# Patient Record
Sex: FEMALE
[De-identification: names, ages, dates, MRNs, and addresses within clinical notes are randomized per-mention and may not be internally consistent; named-entity substitution may affect disease eponyms.]

---

## 2020-05-04 ENCOUNTER — HOSPITAL ENCOUNTER (INPATIENT)
Dept: HOSPITAL 88 - FSED | Age: 55
LOS: 11 days | Discharge: NURSING, REHAB OR LONG-TERM CARE | DRG: 853 | End: 2020-05-15
Attending: INTERNAL MEDICINE | Admitting: INTERNAL MEDICINE
Payer: COMMERCIAL

## 2020-05-04 VITALS — WEIGHT: 100 LBS | BODY MASS INDEX: 19.63 KG/M2 | HEIGHT: 60 IN

## 2020-05-04 VITALS — DIASTOLIC BLOOD PRESSURE: 51 MMHG | SYSTOLIC BLOOD PRESSURE: 104 MMHG

## 2020-05-04 VITALS — SYSTOLIC BLOOD PRESSURE: 104 MMHG | DIASTOLIC BLOOD PRESSURE: 51 MMHG

## 2020-05-04 DIAGNOSIS — J69.0: ICD-10-CM

## 2020-05-04 DIAGNOSIS — E87.5: ICD-10-CM

## 2020-05-04 DIAGNOSIS — A41.9: Primary | ICD-10-CM

## 2020-05-04 DIAGNOSIS — L02.91: ICD-10-CM

## 2020-05-04 DIAGNOSIS — L89.210: ICD-10-CM

## 2020-05-04 PROCEDURE — 99251: CPT

## 2020-05-04 PROCEDURE — 84466 ASSAY OF TRANSFERRIN: CPT

## 2020-05-04 PROCEDURE — 94640 AIRWAY INHALATION TREATMENT: CPT

## 2020-05-04 PROCEDURE — 87075 CULTR BACTERIA EXCEPT BLOOD: CPT

## 2020-05-04 PROCEDURE — 84132 ASSAY OF SERUM POTASSIUM: CPT

## 2020-05-04 PROCEDURE — 94667 MNPJ CHEST WALL 1ST: CPT

## 2020-05-04 PROCEDURE — 84145 PROCALCITONIN (PCT): CPT

## 2020-05-04 PROCEDURE — 85025 COMPLETE CBC W/AUTO DIFF WBC: CPT

## 2020-05-04 PROCEDURE — 82805 BLOOD GASES W/O2 SATURATION: CPT

## 2020-05-04 PROCEDURE — 36569 INSJ PICC 5 YR+ W/O IMAGING: CPT

## 2020-05-04 PROCEDURE — 36600 WITHDRAWAL OF ARTERIAL BLOOD: CPT

## 2020-05-04 PROCEDURE — 71045 X-RAY EXAM CHEST 1 VIEW: CPT

## 2020-05-04 PROCEDURE — 80048 BASIC METABOLIC PNL TOTAL CA: CPT

## 2020-05-04 PROCEDURE — 87086 URINE CULTURE/COLONY COUNT: CPT

## 2020-05-04 PROCEDURE — 82607 VITAMIN B-12: CPT

## 2020-05-04 PROCEDURE — 94669 MECHANICAL CHEST WALL OSCILL: CPT

## 2020-05-04 PROCEDURE — 51700 IRRIGATION OF BLADDER: CPT

## 2020-05-04 PROCEDURE — 99284 EMERGENCY DEPT VISIT MOD MDM: CPT

## 2020-05-04 PROCEDURE — 81003 URINALYSIS AUTO W/O SCOPE: CPT

## 2020-05-04 PROCEDURE — 80202 ASSAY OF VANCOMYCIN: CPT

## 2020-05-04 PROCEDURE — 82746 ASSAY OF FOLIC ACID SERUM: CPT

## 2020-05-04 PROCEDURE — 87205 SMEAR GRAM STAIN: CPT

## 2020-05-04 PROCEDURE — 87071 CULTURE AEROBIC QUANT OTHER: CPT

## 2020-05-04 PROCEDURE — 87040 BLOOD CULTURE FOR BACTERIA: CPT

## 2020-05-04 PROCEDURE — 83540 ASSAY OF IRON: CPT

## 2020-05-04 PROCEDURE — 94668 MNPJ CHEST WALL SBSQ: CPT

## 2020-05-04 PROCEDURE — 80053 COMPREHEN METABOLIC PANEL: CPT

## 2020-05-04 PROCEDURE — 88304 TISSUE EXAM BY PATHOLOGIST: CPT

## 2020-05-04 PROCEDURE — 43246 EGD PLACE GASTROSTOMY TUBE: CPT

## 2020-05-04 PROCEDURE — 87070 CULTURE OTHR SPECIMN AEROBIC: CPT

## 2020-05-04 PROCEDURE — 87635 SARS-COV-2 COVID-19 AMP PRB: CPT

## 2020-05-04 PROCEDURE — 85045 AUTOMATED RETICULOCYTE COUNT: CPT

## 2020-05-04 PROCEDURE — 83605 ASSAY OF LACTIC ACID: CPT

## 2020-05-04 PROCEDURE — 76604 US EXAM CHEST: CPT

## 2020-05-04 PROCEDURE — 36415 COLL VENOUS BLD VENIPUNCTURE: CPT

## 2020-05-04 PROCEDURE — 87186 SC STD MICRODIL/AGAR DIL: CPT

## 2020-05-04 RX ADMIN — SODIUM CHLORIDE SCH MLS/HR: 9 INJECTION, SOLUTION INTRAVENOUS at 21:46

## 2020-05-04 NOTE — XMS REPORT
ICU 7 Greer/Kira @0220 (she will call when ready, tx another patient)   Patient Summary Document

                             Created on: 2020



MELANY TIRADO

External Reference #: [id number]

: 1965

Sex: Female



Demographics





                          Address                   [address] [place]  [address]

 

                          Home Phone                [phone]

 

                          Preferred Language        Unknown

 

                          Marital Status            Unknown

 

                          Oriental orthodox Affiliation     Unknown

 

                          Race                      Unknown

 

                                        Additional Race(s) 

Other



 

                          Ethnic Group              Unknown





Author





                          Author                    [facility]

t

 

                          Organization              [facility]

 

                          Address                   Unknown

 

                          Phone                     Unavailable







Support





                Name            Relationship    Address         Phone

 

                    KAT NEGRETE     PRS                 [address] [place]  [address]                     [phone]

 

                    NEGRETENEDA RICHARDS      Next Of Kin         [address], [place]  [address] [phone]

 

                NO,  FAMILY     Caregiver       Unknown         Unavailable

 

                NO,  PCP        Caregiver       Unknown         Unavailable

 

                    ZURI SERRA,  MIAN  Caregiver           [address] [place]  [address]                     [phone]

 

                    CHARLINE LADNON MD Caregiver           [address] [facility]

[place]  [address]                      Unavailable

 

                    NEGRETEACE RICHARDSRACHEL     PRS                 [address] [place]  [address]                     [phone]







Care Team Providers





                    Care Team Member Name Role                Phone

 

                    NO,  PCP            PP                  Unavailable

 

                    CHARLINE LANDON Unavailable         Unavailable







Payers





             Payer Name   Policy Type  Policy Number Effective Date Expiration D

ate

 

             [employer] [place] [employer]              [id number] 2017-10-01 00:00:0

0  

 

             [employer] [place] [employer]              [id number] 2017-10-01 00:00:0

0  







Problems

This patient has no known problems.



Allergies, Adverse Reactions, Alerts





        Allergy Name Allergy Type Status  Severity Reaction(s) Onset Date Inacti

ve Date 

Treating Clinician                      Comments

 

        No Known Allergies DA      Active  U               2019 00:00:00  

                







Medications





             Ordered Medication Name Filled Medication Name Start Date   Stop Da

te    Current 

Medication? Ordering Clinician Indication Dosage     Frequency  Signature (SIG) 

Comments                                Components

 

       Diazepam 5 Mg/5 Ml Solution Diazepam 5 Mg/5 Ml Solution               Yes

                  5             As 

Needed for Agitation                                 







Encounters





             Start Date/Time End Date/Time Encounter Type Admission Type Attendi

[facility]       Care Department     Encounter ID

 

             2019-03-10 21:01:00 2019-03-10 21:01:00 Registered Emergency Room 1

            DAR LANDON             [facility]              [id number]

 

        2019 13:49:00 2019 23:59:00 Discharged Recurring            

     [facility]  

[id number]







Results





           Test Description Test Time  Test Comments Text Results Atomic Results

 Result 

Comments

 

                GLUBED          2019 08:22:00                   

 

   

 

                GLUBED (test code = GLUBED) 96 mg/dL                  Perf

ormed by certified  at 

[facility]





[id number] 08:20:00* 



                Test Item       Value           Reference Range Comments

 

                GLUBED (test code = GLUBED) 108 mg/dL                 Perf

ormed by certified  at 

[facility]





[id number] 08:20:00* 



                Test Item       Value           Reference Range Comments

 

                GLUBED (test code = GLUBED) 117 mg/dL                 Perf

ormed by certified  at 

[facility]





[id number] 12:44:00* 



                Test Item       Value           Reference Range Comments

 

                GLUBED (test code = GLUBED) 104 mg/dL                 Perf

ormed by certified  at 

[facility]





[id number] 05:56:00* 



                Test Item       Value           Reference Range Comments

 

                GLUBED (test code = GLUBED) 114 mg/dL                 Perf

ormed by certified  at 

[facility]





BASIC METABOLIC [id number] 05:29:00* 



                Test Item       Value           Reference Range Comments

 

                SODIUM (test code = NA) 137 mmol/L      136-145          

 

                POTASSIUM (test code = K) 4.5 mmol/L      3.5-5.1          

 

                CHLORIDE (test code = CL) 104.0 mmol/L               

 

                CARBON DIOXIDE (test code = CO2) 23.0 mmol/L     21-32          

  

 

                ANION GAP (test code = GAP) 14.5            10-20            

 

                GLUCOSE (test code = GLU) 93 mg/dL                   

 

                BLOOD UREA NITROGEN (test code = BUN) 7 mg/dL         7-18      

       

 

                GLOMERULAR FILTRATION RATE (test code = GFR) > 60 mL/min     >=6

0            Estimated GFR by 

using Modified MDRD formula.Chronic kidney disease is defined as either kidney 
damageor GFR <60 mL/min/1.73 m2 for >3 months.

 

                CREATININE (test code = CREAT) 0.30 mg/dL      0.55-1.02       *

*Note change in reference 

range due to change in reagent.**

 

                BUN/CREATININE RATIO (test code = BUN/CREA) 21.6            10-2

0            

 

                CALCIUM (test code = CA) 8.6 mg/dL       8.5-10.1         





[id number] 05:29:00* 



                Test Item       Value           Reference Range Comments

 

                PHOSPHORUS (test code = PHOS) 3.7 mg/dL       2.5-4.9          





[id number] 05:29:00* 



                Test Item       Value           Reference Range Comments

 

                MAGNESIUM (test code = MAG) 2.5 mg/dL       1.8-2.4          





[id number] 00:11:00* 



                Test Item       Value           Reference Range Comments

 

                GLUBED (test code = GLUBED) 111 mg/dL                 Perf

ormed by certified  at 

[facility]





[id number] 20:33:00* 



                Test Item       Value           Reference Range Comments

 

                GLUBED (test code = GLUBED) 105 mg/dL                 Perf

ormed by certified  at 

[facility]





[id number] 16:47:00* 



                Test Item       Value           Reference Range Comments

 

                GLUBED (test code = GLUBED) 107 mg/dL                 Perf

ormed by certified  at 

[facility]





GASTRIC,[id number] 15:06:00
--------------------------------------------------------------------------------
------------RUN DATE: 19                         [facility]           
              PAGE 1   RUN TIME: 1506                            Specimen Inqui
ry                    RUN USER: INTERFACE                                       
                   ------------------------------------------------------------
--------------------------------PATIENT: MELANY NEGRETE                  ACCT #: [id number]
[id number] LOC:  DIANA DANG #: [id number]                                    
  AGE/SX: 54/F         ROOM:      RE19REG DR:  Damir Morley MD          :    [birth year]     BED:  A          DIS:                          
                     STATUS: ADM IN       TLOC:           --------------------
------------------------------------------------------------------------ SPEC #:
[id number]     RECD:      STATUS:  CL           [facility] #: [id number]
[id number]                           FRED: 19-          DR: Lisandro Reyes MD   
             ENTERED:      SP TYPE: GASTRIC BX     OTHR DR: Matthew Musa i, MD, Muhammad MDORDERED:  GROSS                                           
                                  COPIES TO:   Lisandro Reyes MD   [address]   [place], [place] [id number] [phone]    Matthew Gloria MD   [address]   [place], [place] [address] [phone]    Najma Thompson MD   [address] [place] [address]   [phone] PROCEDURES: GROSS () TISS
UES:           GASTRIC CORPUS - COLD BX         CLINICAL HISTORY    COLLECTION D
ATE:  2019       DYSPHAGIA   POST-OP DIAGNOSIS:  MILD GASTRITIS; PEG PLACEM
ENT             COMMENT   H. Pylori immunostaining is ordered to rule out Helico
bacter.  Addendum report to  follow.           FINAL DIAGNOSIS    Stomach, biops
y:        CHRONIC INACTIVE GASTRITIS, MODERATE        NO DIAGNOSTIC HELICOBACTER
IDENTIFIED        NO ULCERATION, INTESTINAL METAPLASIA, DYSPLASIA OR         MA
LIGNANCY IDENTIFIED                                    ** CONTINUED ON NEXT PAGE
** ----------------------------------------------------------------------------
----------------RUN DATE: 19                         [facility]       
                  PAGE 2   RUN TIME: 1506                            Specimen I
nquiry                    RUN USER: INTERFACE                                   
                       --------------------------------------------------------
------------------------------------SPEC #: [id number]    PATIENT: NEGRETE,TER
ASHOK                   #[id number]  (Continued)--------------------------------
------------------------------------------------------------         FINAL DIAGN
OSIS           (Continued)            FA/sm   D   [id number], [id number]           [facility]    The specimen is received in formalin, labeled with the patient's name,  
identified as "gastric biopsy", and consists of tan biopsy material measuring   
0.5 cm in aggregate.  An H E and a giemsa stain will be prepared.       GROSS P
ERFORMED AT [facility]   
[address] [place] [address]   [phone]           MICROSCOPI
C    Sections of the gastric biopsy shows a moderate increase in number of chron
ic   inflammatory infiltrates of the stroma and small lymphoid aggregates. There
is   no significant intraepithelial lymphocytosis.  Giemsa stain with appropria
te   control shows no diagnostic helicobacter.  No acute inflammation, intestina
l   metaplasia, dysplasia or malignancy is identified.--------------------------
------------------------------------------------------------------ Signed SIGNAT
URE ON FILE                        Fabián Bailey MD 19 1506     ----------
--------------------------------------------------------------------------------
--                                                      ** END OF REPORT ** 
GASTRIC,[id number] 15:06:00
--------------------------------------------------------------------------------
------------RUN DATE: 19                         [facility]           
              PAGE 1   RUN TIME: 1229                            Specimen Inqui
ry                    RUN USER: INTERFACE                                       
                   ------------------------------------------------------------
--------------------------------PATIENT: MELANY NEGRETE                  ACCT #: V
[id number] LOC:  DIANA      U #: [id number]                                    
  AGE/SX: 54/F         ROOM:      RE19MAXINE DR:  Damir Morley MD          :    [birth year]     BED:  A          DIS:                          
                     STATUS: ADM IN       TLOC:           --------------------
------------------------------------------------------------------------ SPEC #:
[id number]     RECD:      STATUS:  CL LUCAS #: [id number]
[id number]                           FRED: 19-         SUBM DR: Lisandro Reyes MD   
             ENTERED:      SP TYPE: GASTRIC BX     OTHR DR: Matthew Musa i, MD, Muhammad MDORDERED:  GROSS                                           
                                  COPIES TO:   Lisandro Reyes MD   [address] [place] [address] [phone]    Matthew Gloria MD   [address]   [place], [place] [id number] [phone]    Najma Thompson MD   [address] [place] [address]   [phone] PROCEDURES: GROSS () TISS
UES:           GASTRIC CORPUS - COLD BX        ADDENDUM FINDINGS  Addendum #1   
          Entered:    THE ADDENDUM IS ISSUED TO REPORT THE RESULT 
OF IMMUNOSTAIN.  H. Pylori immunostaining with appropriate control is performed 
at [facility] and interpreted  at [facility] and shows no evidenc
e of Helicobacter.  The diagnosis remains unchanges.     CPT code: 48887  Addend
um Signed SIGNATURE ON FILE               Fabián Bailey MD 19     ---
--------------------------------------------------------------------------------
---------                                   ** CONTINUED ON NEXT PAGE ** -------
--------------------------------------------------------------------------------
-----RUN DATE: 19                         [facility]                  
       PAGE 2   RUN TIME: 1229                            Specimen Inquiry      
             RUN USER: INTERFACE                                                
          -------------------------------------------------------------------
-------------------------SPEC #: [id number]    PATIENT: MELANY NEGRETE        
          #[id number]  (Continued)-------------------------------------------
-------------------------------------------------          CLINICAL HISTORY    C
OLLECTION DATE:  2019       DYSPHAGIA   POST-OP DIAGNOSIS:  MILD GASTRITIS;
PEG PLACEMENT             COMMENT   H. Pylori immunostaining is ordered to rule 
out Helicobacter.  Addendum report to  follow.           FINAL DIAGNOSIS    Sto
mach, biopsy:        CHRONIC INACTIVE GASTRITIS, MODERATE        NO DIAGNOSTIC H
ELICOBACTER IDENTIFIED        NO ULCERATION, INTESTINAL METAPLASIA, DYSPLASIA OR
        MALIGNANCY IDENTIFIED               FA/   D   [id number], 13655           
MACROSCOPIC    The specimen is received in formalin, labeled with the patient's 
name,   identified as "gastric biopsy", and consists of tan biopsy material eulogio
uring   0.5 cm in aggregate.  An H E and a giemsa stain will be prepared.       
GROSS PERFORMED AT [facility]   [address] [place] [address] [phone]           RAJ
ROSCOPIC    Sections of the gastric biopsy shows a moderate increase in number o
f chronic   inflammatory infiltrates of the stroma and small lymphoid aggregates
. There is   no significant intraepithelial lymphocytosis.  Giemsa stain with ap
propriate   control shows no diagnostic helicobacter.  No acute inflammation, in
testinal   metaplasia, dysplasia or malignancy is identified.-------------------
------------------------------------------------------------------------- Signed
SIGNATURE ON FILE                        Fabián Bailey MD 19 1506     ---
--------------------------------------------------------------------------------
---------                                    ** END OF REPORT ** GLUBED
2019 12:21:00* 



                Test Item       Value           Reference Range Comments

 

                GLUBED (test code = GLUBED) 99 mg/dL                  Perf

ormed by certified  at 

[facility]





[id number] 10:26:00* 



                Test Item       Value           Reference Range Comments

 

                GLUBED (test code = GLUBED) 117 mg/dL                 Perf

ormed by certified  at 

[facility]





[id number] 10:26:00* 



                Test Item       Value           Reference Range Comments

 

                GLUBED (test code = GLUBED) 109 mg/dL                 Perf

ormed by certified  at 

[facility]





[id number] 10:25:00* 



                Test Item       Value           Reference Range Comments

 

                GLUBED (test code = GLUBED) 102 mg/dL                 Perf

ormed by certified  at 

[facility]





[id number] 10:25:00* 



                Test Item       Value           Reference Range Comments

 

                GLUBED (test code = GLUBED) 118 mg/dL                 Perf

ormed by certified  at 

[facility]





[id number] 10:24:00* 



                Test Item       Value           Reference Range Comments

 

                GLUBED (test code = GLUBED) 117 mg/dL                 Perf

ormed by certified  at 

[facility]





[id number] 10:22:00* 



                Test Item       Value           Reference Range Comments

 

                GLUBED (test code = GLUBED) 110 mg/dL                 Perf

ormed by certified  at 

[facility]





[id number] 10:22:00* 



                Test Item       Value           Reference Range Comments

 

                GLUBED (test code = GLUBED) 102 mg/dL                 Perf

ormed by certified  at 

[facility]





[id number] 10:21:00* 



                Test Item       Value           Reference Range Comments

 

                GLUBED (test code = GLUBED) 110 mg/dL                 Perf

ormed by certified  at 

[facility]





[id number] 10:20:00* 



                Test Item       Value           Reference Range Comments

 

                GLUBED (test code = GLUBED) 110 mg/dL                 Perf

ormed by certified  at 

[facility]





[id number] 10:19:00* 



                Test Item       Value           Reference Range Comments

 

                GLUBED (test code = GLUBED) 104 mg/dL                 Perf

ormed by certified  at 

[facility]





[id number] 10:18:00* 



                Test Item       Value           Reference Range Comments

 

                GLUBED (test code = GLUBED) 99 mg/dL                  Perf

ormed by certified  at 

[facility]





[id number] 10:16:00* 



                Test Item       Value           Reference Range Comments

 

                GLUBED (test code = GLUBED) 84 mg/dL                  Perf

ormed by certified  at 

[facility]





[id number] 10:15:00* 



                Test Item       Value           Reference Range Comments

 

                GLUBED (test code = GLUBED) 89 mg/dL                  Perf

ormed by certified  at 

[facility]





[id number] 10:15:00* 



                Test Item       Value           Reference Range Comments

 

                GLUBED (test code = GLUBED) 127 mg/dL                 Perf

ormed by certified  at 

[facility]





[id number] 10:14:00* 



                Test Item       Value           Reference Range Comments

 

                GLUBED (test code = GLUBED) 89 mg/dL                  Perf

ormed by certified  at 

[facility]





[id number] 10:13:00* 



                Test Item       Value           Reference Range Comments

 

                GLUBED (test code = GLUBED) 99 mg/dL                  Perf

ormed by certified  at 

[facility]





[id number] 10:12:00* 



                Test Item       Value           Reference Range Comments

 

                GLUBED (test code = GLUBED) 134 mg/dL                 Perf

ormed by certified  at 

[facility]





BASIC METABOLIC [id number] 08:42:00* 



                Test Item       Value           Reference Range Comments

 

                SODIUM (test code = NA) 138 mmol/L      136-145          

 

                POTASSIUM (test code = K) 4.2 mmol/L      3.5-5.1          

 

                CHLORIDE (test code = CL) 103.0 mmol/L               

 

                CARBON DIOXIDE (test code = CO2) 28.0 mmol/L     21-32          

  

 

                ANION GAP (test code = GAP) 11.2            10-20            

 

                GLUCOSE (test code = GLU) 113 mg/dL                  

 

                BLOOD UREA NITROGEN (test code = BUN) 7 mg/dL         7-18      

       

 

                GLOMERULAR FILTRATION RATE (test code = GFR) > 60 mL/min     >=6

0            Estimated GFR by 

using Modified MDRD formula.Chronic kidney disease is defined as either kidney 
damageor GFR <60 mL/min/1.73 m2 for >3 months.

 

                CREATININE (test code = CREAT) 0.30 mg/dL      0.55-1.02       *

*Note change in reference 

range due to change in reagent.**

 

                BUN/CREATININE RATIO (test code = BUN/CREA) 20.9            10-2

0            

 

                CALCIUM (test code = CA) 8.4 mg/dL       8.5-10.1         





[id number] 08:42:00* 



                Test Item       Value           Reference Range Comments

 

                PHOSPHORUS (test code = PHOS) 3.4 mg/dL       2.5-4.9          





[id number] 08:40:00* 



                Test Item       Value           Reference Range Comments

 

                MAGNESIUM (test code = MAG) 2.5 mg/dL       1.8-2.4          





BASIC METABOLIC [id number] 08:35:00* 



                Test Item       Value           Reference Range Comments

 

                SODIUM (test code = NA) 138 mmol/L      136-145          

 

                POTASSIUM (test code = K) 4.2 mmol/L      3.5-5.1          

 

                CHLORIDE (test code = CL) 103.0 mmol/L               

 

                CARBON DIOXIDE (test code = CO2)  mmol/L         21-32          

  

 

                ANION GAP (test code = GAP)                 10-20            

 

                GLUCOSE (test code = GLU)  mg/dL                     

 

                BLOOD UREA NITROGEN (test code = BUN)  mg/dL          7-18      

       

 

                GLOMERULAR FILTRATION RATE (test code = GFR)  mL/min         >=6

0             

 

                CREATININE (test code = CREAT)  mg/dL          0.55-1.02        

 

                BUN/CREATININE RATIO (test code = BUN/CREA)                 10-2

0            

 

                CALCIUM (test code = CA)  mg/dL          8.5-10.1         





[id number] 08:35:00* 



                Test Item       Value           Reference Range Comments

 

                PHOSPHORUS (test code = PHOS)  mg/dL          2.5-4.9          





BASIC METABOLIC [id number] 00:41:00* 



                Test Item       Value           Reference Range Comments

 

                SODIUM (test code = NA) 138 mmol/L      136-145          

 

                POTASSIUM (test code = K) 3.9 mmol/L      3.5-5.1          

 

                CHLORIDE (test code = CL) 102.0 mmol/L               

 

                CARBON DIOXIDE (test code = CO2) 30.0 mmol/L     21-32          

  

 

                ANION GAP (test code = GAP) 9.9             10-20            

 

                GLUCOSE (test code = GLU) 95 mg/dL                   

 

                BLOOD UREA NITROGEN (test code = BUN) 6 mg/dL         7-18      

       

 

                GLOMERULAR FILTRATION RATE (test code = GFR) > 60 mL/min     >=6

0            Estimated GFR by 

using Modified MDRD formula.Chronic kidney disease is defined as either kidney 
damageor GFR <60 mL/min/1.73 m2 for >3 months.

 

                CREATININE (test code = CREAT) 0.40 mg/dL      0.55-1.02       *

*Note change in reference 

range due to change in reagent.**

 

                BUN/CREATININE RATIO (test code = BUN/CREA) 16.9            10-2

0            

 

                CALCIUM (test code = CA) 8.6 mg/dL       8.5-10.1         





[id number] 00:41:00* 



                Test Item       Value           Reference Range Comments

 

                PHOSPHORUS (test code = PHOS) 3.4 mg/dL       2.5-4.9          





[id number] 00:41:00* 



                Test Item       Value           Reference Range Comments

 

                MAGNESIUM (test code = MAG) 2.3 mg/dL       1.8-2.4          





BASIC METABOLIC [id number] 00:06:00* 



                Test Item       Value           Reference Range Comments

 

                SODIUM (test code = NA) 138 mmol/L      136-145          

 

                POTASSIUM (test code = K) 3.9 mmol/L      3.5-5.1          

 

                CHLORIDE (test code = CL) 102.0 mmol/L               

 

                CARBON DIOXIDE (test code = CO2)  mmol/L         21-32          

  

 

                ANION GAP (test code = GAP)                 10-20            

 

                GLUCOSE (test code = GLU)  mg/dL                     

 

                BLOOD UREA NITROGEN (test code = BUN)  mg/dL          7-18      

       

 

                GLOMERULAR FILTRATION RATE (test code = GFR)  mL/min         >=6

0             

 

                CREATININE (test code = CREAT)  mg/dL          0.55-1.02        

 

                BUN/CREATININE RATIO (test code = BUN/CREA)                 10-2

0            

 

                CALCIUM (test code = CA)  mg/dL          8.5-10.1         





[id number] 00:06:00* 



                Test Item       Value           Reference Range Comments

 

                PHOSPHORUS (test code = PHOS)  mg/dL          2.5-4.9          





[id number] 00:06:00* 



                Test Item       Value           Reference Range Comments

 

                MAGNESIUM (test code = MAG)  mg/dL          1.8-2.4          





CBC W/O [id number] 06:42:00* 



                Test Item       Value           Reference Range Comments

 

                WHITE BLOOD CELL (test code = WBC) 10.4 K/mm3      4.5-12.5     

    

 

                RED BLOOD CELL (test code = RBC) 3.37 mill/mm3   3.7-5.2        

  

 

                HEMOGLOBIN (test code = HGB) 10.6 gram/dL    11.5-15.5        

 

                HEMATOCRIT (test code = HCT) 31.9 %          36.0-46.0        

 

                MEAN CELL VOLUME (test code = MCV) 94.7 fL         80-98        

    

 

                MEAN CELL HGB (test code = MCH) 31.5 picogram   27.0-33.0       

 

 

                MEAN CELL HGB CONCETRATION (test code = MCHC) 33.2 gram/dL    33

.0-36.0        

 

                RED CELL DISTRIBUTION WIDTH (test code = RDW) 12.8 %          11

.6-16.2        

 

                PLATELET COUNT (test code = PLT) 313 K/mm3       150-450        

  

 

                MEAN PLATELET VOLUME (test code = MPV) 10.0 fL         6.7-11.0 

        





BASIC METABOLIC [id number] 06:26:00* 



                Test Item       Value           Reference Range Comments

 

                SODIUM (test code = NA) 139 mmol/L      136-145          

 

                POTASSIUM (test code = K) 3.5 mmol/L      3.5-5.1          

 

                CHLORIDE (test code = CL) 106.0 mmol/L               

 

                CARBON DIOXIDE (test code = CO2) 23.0 mmol/L     21-32          

  

 

                ANION GAP (test code = GAP) 13.5            10-20            

 

                GLUCOSE (test code = GLU) 102 mg/dL                  

 

                BLOOD UREA NITROGEN (test code = BUN) 3 mg/dL         7-18      

       

 

                GLOMERULAR FILTRATION RATE (test code = GFR) > 60 mL/min     >=6

0            Estimated GFR by 

using Modified MDRD formula.Chronic kidney disease is defined as either kidney 
damageor GFR <60 mL/min/1.73 m2 for >3 months.

 

                CREATININE (test code = CREAT) 0.20 mg/dL      0.55-1.02       *

*Note change in reference 

range due to change in reagent.**

 

                BUN/CREATININE RATIO (test code = BUN/CREA) 15.0            10-2

0            

 

                CALCIUM (test code = CA) 8.4 mg/dL       8.5-10.1         





[id number] 06:26:00* 



                Test Item       Value           Reference Range Comments

 

                PHOSPHORUS (test code = PHOS) 4.0 mg/dL       2.5-4.9          





[id number] 06:26:00* 



                Test Item       Value           Reference Range Comments

 

                MAGNESIUM (test code = MAG) 2.1 mg/dL       1.8-2.4          





BASIC METABOLIC [id number] 06:20:00* 



                Test Item       Value           Reference Range Comments

 

                SODIUM (test code = NA) 139 mmol/L      136-145          

 

                POTASSIUM (test code = K) 3.5 mmol/L      3.5-5.1          

 

                CHLORIDE (test code = CL) 106.0 mmol/L               

 

                CARBON DIOXIDE (test code = CO2)  mmol/L         21-32          

  

 

                ANION GAP (test code = GAP)                 10-20            

 

                GLUCOSE (test code = GLU)  mg/dL                     

 

                BLOOD UREA NITROGEN (test code = BUN)  mg/dL          7-18      

       

 

                GLOMERULAR FILTRATION RATE (test code = GFR)  mL/min         >=6

0             

 

                CREATININE (test code = CREAT)  mg/dL          0.55-1.02        

 

                BUN/CREATININE RATIO (test code = BUN/CREA)                 10-2

0            

 

                CALCIUM (test code = CA)  mg/dL          8.5-10.1         





[id number] 06:20:00* 



                Test Item       Value           Reference Range Comments

 

                PHOSPHORUS (test code = PHOS)  mg/dL          2.5-4.9          





[id number] 06:20:00* 



                Test Item       Value           Reference Range Comments

 

                MAGNESIUM (test code = MAG)  mg/dL          1.8-2.4          





BASIC METABOLIC [id number] 04:58:00* 



                Test Item       Value           Reference Range Comments

 

                SODIUM (test code = NA) 139 mmol/L      136-145          

 

                POTASSIUM (test code = K) 3.6 mmol/L      3.5-5.1          

 

                CHLORIDE (test code = CL) 108.0 mmol/L               

 

                CARBON DIOXIDE (test code = CO2) 23.0 mmol/L     21-32          

  

 

                ANION GAP (test code = GAP) 11.6            10-20            

 

                GLUCOSE (test code = GLU) 95 mg/dL                   

 

                BLOOD UREA NITROGEN (test code = BUN) 4 mg/dL         7-18      

       

 

                GLOMERULAR FILTRATION RATE (test code = GFR) > 60 mL/min     >=6

0            Estimated GFR by 

using Modified MDRD formula.Chronic kidney disease is defined as either kidney 
damageor GFR <60 mL/min/1.73 m2 for >3 months.

 

                CREATININE (test code = CREAT) 0.20 mg/dL      0.55-1.02       *

*Note change in reference 

range due to change in reagent.**

 

                BUN/CREATININE RATIO (test code = BUN/CREA) 20.0            10-2

0            

 

                CALCIUM (test code = CA) 8.3 mg/dL       8.5-10.1         





[id number] 04:58:00* 



                Test Item       Value           Reference Range Comments

 

                PHOSPHORUS (test code = PHOS) 3.4 mg/dL       2.5-4.9          





[id number] 04:58:00* 



                Test Item       Value           Reference Range Comments

 

                MAGNESIUM (test code = MAG) 2.0 mg/dL       1.8-2.4          





BASIC METABOLIC [id number] 04:40:00* 



                Test Item       Value           Reference Range Comments

 

                SODIUM (test code = NA) 139 mmol/L      136-145          

 

                POTASSIUM (test code = K) 3.6 mmol/L      3.5-5.1          

 

                CHLORIDE (test code = CL) 108.0 mmol/L               

 

                CARBON DIOXIDE (test code = CO2)  mmol/L         21-32          

  

 

                ANION GAP (test code = GAP)                 10-20            

 

                GLUCOSE (test code = GLU)  mg/dL                     

 

                BLOOD UREA NITROGEN (test code = BUN)  mg/dL          7-18      

       

 

                GLOMERULAR FILTRATION RATE (test code = GFR)  mL/min         >=6

0             

 

                CREATININE (test code = CREAT)  mg/dL          0.55-1.02        

 

                BUN/CREATININE RATIO (test code = BUN/CREA)                 10-2

0            

 

                CALCIUM (test code = CA)  mg/dL          8.5-10.1         





[id number] 04:40:00* 



                Test Item       Value           Reference Range Comments

 

                PHOSPHORUS (test code = PHOS)  mg/dL          2.5-4.9          





[id number] 04:40:00* 



                Test Item       Value           Reference Range Comments

 

                MAGNESIUM (test code = MAG)  mg/dL          1.8-2.4          





[id number] 07:46:00* 



                Test Item       Value           Reference Range Comments

 

                GLUBED (test code = GLUBED) 121 mg/dL                 Perf

ormed by certified  at 

[facility]





BASIC METABOLIC [id number] 05:59:00* 



                Test Item       Value           Reference Range Comments

 

                SODIUM (test code = NA) 139 mmol/L      136-145          

 

                POTASSIUM (test code = K) 3.3 mmol/L      3.5-5.1          

 

                CHLORIDE (test code = CL) 106.0 mmol/L               

 

                CARBON DIOXIDE (test code = CO2) 26.0 mmol/L     21-32          

  

 

                ANION GAP (test code = GAP) 10.3            10-20            

 

                GLUCOSE (test code = GLU) 103 mg/dL                  

 

                BLOOD UREA NITROGEN (test code = BUN) 4 mg/dL         7-18      

       

 

                GLOMERULAR FILTRATION RATE (test code = GFR) > 60 mL/min     >=6

0            Estimated GFR by 

using Modified MDRD formula.Chronic kidney disease is defined as either kidney 
damageor GFR <60 mL/min/1.73 m2 for >3 months.

 

                CREATININE (test code = CREAT) 0.20 mg/dL      0.55-1.02       *

*Note change in reference 

range due to change in reagent.**

 

                BUN/CREATININE RATIO (test code = BUN/CREA) 20.0            10-2

0            

 

                CALCIUM (test code = CA) 8.4 mg/dL       8.5-10.1         





[id number] 05:59:00* 



                Test Item       Value           Reference Range Comments

 

                PHOSPHORUS (test code = PHOS) 3.0 mg/dL       2.5-4.9          





[id number] 05:59:00* 



                Test Item       Value           Reference Range Comments

 

                MAGNESIUM (test code = MAG) 2.1 mg/dL       1.8-2.4          





[id number] 05:57:00* 



                Test Item       Value           Reference Range Comments

 

                GLUBED (test code = GLUBED) 117 mg/dL                 Perf

ormed by certified  at 

[facility]





[id number] 21:04:00* 



                Test Item       Value           Reference Range Comments

 

                GLUBED (test code = GLUBED) 123 mg/dL                 Perf

ormed by certified  at 

[facility]





BASIC METABOLIC [id number] 19:11:00* 



                Test Item       Value           Reference Range Comments

 

                SODIUM (test code = NA) 138 mmol/L      136-145          

 

                POTASSIUM (test code = K) 3.2 mmol/L      3.5-5.1          

 

                CHLORIDE (test code = CL) 106.0 mmol/L               

 

                CARBON DIOXIDE (test code = CO2) 25.0 mmol/L     21-32          

  

 

                ANION GAP (test code = GAP) 10.2            10-20            

 

                GLUCOSE (test code = GLU) 127 mg/dL                  

 

                BLOOD UREA NITROGEN (test code = BUN) 4 mg/dL         7-18      

       

 

                GLOMERULAR FILTRATION RATE (test code = GFR) > 60 mL/min     >=6

0            Estimated GFR by 

using Modified MDRD formula.Chronic kidney disease is defined as either kidney 
damageor GFR <60 mL/min/1.73 m2 for >3 months.

 

                CREATININE (test code = CREAT) 0.30 mg/dL      0.55-1.02       *

*Note change in reference 

range due to change in reagent.**

 

                BUN/CREATININE RATIO (test code = BUN/CREA) 13.3            10-2

0            

 

                CALCIUM (test code = CA) 8.3 mg/dL       8.5-10.1         





[id number] 19:11:00* 



                Test Item       Value           Reference Range Comments

 

                PHOSPHORUS (test code = PHOS) 2.8 mg/dL       2.5-4.9          





[id number] 19:11:00* 



                Test Item       Value           Reference Range Comments

 

                MAGNESIUM (test code = MAG) 2.1 mg/dL       1.8-2.4          





BASIC METABOLIC [id number] 19:02:00* 



                Test Item       Value           Reference Range Comments

 

                SODIUM (test code = NA) 138 mmol/L      136-145          

 

                POTASSIUM (test code = K) 3.2 mmol/L      3.5-5.1          

 

                CHLORIDE (test code = CL) 106.0 mmol/L               

 

                CARBON DIOXIDE (test code = CO2)  mmol/L         21-32          

  

 

                ANION GAP (test code = GAP)                 10-20            

 

                GLUCOSE (test code = GLU)  mg/dL                     

 

                BLOOD UREA NITROGEN (test code = BUN)  mg/dL          7-18      

       

 

                GLOMERULAR FILTRATION RATE (test code = GFR)  mL/min         >=6

0             

 

                CREATININE (test code = CREAT)  mg/dL          0.55-1.02        

 

                BUN/CREATININE RATIO (test code = BUN/CREA)                 10-2

0            

 

                CALCIUM (test code = CA)  mg/dL          8.5-10.1         





[id number] 19:02:00* 



                Test Item       Value           Reference Range Comments

 

                PHOSPHORUS (test code = PHOS)  mg/dL          2.5-4.9          





[id number] 19:02:00* 



                Test Item       Value           Reference Range Comments

 

                MAGNESIUM (test code = MAG)  mg/dL          1.8-2.4          





[id number] 16:23:00* 



                Test Item       Value           Reference Range Comments

 

                GLUBED (test code = GLUBED) 98 mg/dL                  Perf

ormed by certified  at 

[facility]





[id number] 11:07:00* 



                Test Item       Value           Reference Range Comments

 

                GLUBED (test code = GLUBED) 111 mg/dL                 Perf

ormed by certified  at 

[facility]





- XR ABDOMEN AP 1 -93-86 10:31:00 FAX:         Damir Morley MD 
[phone]    [place]: B   St: ADM----------
---------------------------------------------------------------------  Name:   MELANY DOS SANTOS                     [facility]                     : [birth year]  Age/S: 54/F           [address]                Unit #: [id number]    
 Loc: V.       [place]  [address]              Phys: Damir Morley MD 
                                              Acct: [id number] Dis Date:      
        Status: ADM IN                                 PHONE #: [phone]    
Exam Date:     2019     1010                   FAX #: [phone]     
Reason: DOBHOFF PLACEMENT                                  EXAMS:               
                               CPT CODE:      [id number] XR ABDOMEN AP 1 V       
                  [id number]                    HISTORY: Dobbhoff placement.         
     COMPARISON: September 10, 2019.               Dobbhoff tube tip in the 
third portion of the duodenum in good       position. No bowel obstruction. The 
pelvis is not included.                 IMPRESSION:                   Dobbhoff t
ube tip in the third portion of the duodenum in good         position.          
** Electronically Signed by LIVE Thomas on 2019 at 1031 **          
           Reported and signed by: Khris Thomas M.D.                           
 CC: Damir Morley MD                                                       
                                                            Technologist: Adelia GROSS(R); Charlene ALVARADO(R)             Trnscrd Date/Time/By: 2019 (1031) : By: [email or web address]           Orig Print D/T: S: 2019 (1030)      
                  PAGE  1                       Signed Report                   
           [id number] 04:33:00* 



                Test Item       Value           Reference Range Comments

 

                GLUBED (test code = GLUBED) 91 mg/dL                  Perf

ormed by certified  at 

[facility]





GLUBED2019-09-10 21:22:00* 



                Test Item       Value           Reference Range Comments

 

                GLUBED (test code = GLUBED) 82 mg/dL                  Perf

ormed by certified  at 

[facility]





- XR ABDOMEN AP 1 -09-10 17:32:00 FAX:         Damir Morley MD 
[phone]    [place]:    St: ADM----------
---------------------------------------------------------------------  Name:   MELANY DOS SANTOS                     [facility]                     : [birth year]  Age/S: 54/F           [address]                Unit #: [id number]    
 Loc: [place]  [address]              Phys: Damir Morley MD 
                                              Acct: [id number] Dis Date:      
        Status: ADM IN                                 PHONE #: [phone]    
Exam Date:     09/10/2019     1631                   FAX #: [phone]     
Reason: DOBHOFF PLACED                                     EXAMS:               
                               CPT CODE:      [id number] XR ABDOMEN AP 1 V       
                  79551                    HISTORY:  DOBHOFF PLACED             
 TECHNIQUE:  AP abdomen x-ray               COMPARISON:  None               
FINDINGS:         Nonobstructive bowel gas pattern. There is a ball of stool in 
the       rectum that measures almost 9 cm in diameter.                Dobbhoff 
tube terminates in the third part of the duodenum.               No intra-abdomi
nal mass effect.                No abnormal calcifications are observed.        
       Visualized osseous structures are intact.                 Visualized tho
rax is within normal limits.                 IMPRESSION:                     
bhoff tube terminates in the third part of the duodenum.         Rectal stool ba
ll measuring almost 9 cm in diameter.          ** Electronically Signed by Jaziel Trivedi MD on 09/10/2019 at 1732 **                      Reported and signed by: 
Jaziel Trivedi MD                CC: Damir Morley MD                          
                                                                                
        Technologist: Charlene Arellano RT(R)                                   T
rnscrd Date/Time/By: 09/10/2019 (1732) : By: [email or web address]          Orig Print D/T:
S: 09/10/2019 (5189)                         PAGE  1                       Sign
ed Report                               - XR CHEST 1 -0910 17:18:00 FAX: Alissa Jorgensen MD   [phone]    [place]: B   St: ADM FAX: Eddy Suarez   [phone]   ----------------------------------------
---------------------------------------  Name:   MELANY NEGRETE                   
 [facility]                     : 1965  Age/S: 54/F           [address]                Unit #: [id number]      Loc: V.       [place]  [address]              Phys: Eddy Kaufman MD                             
                Acct: [id number] Dis Date:               Status: ADM IN       
                         PHONE #: [phone]     Exam Date:     09/10/2019    
1626                   FAX #: [phone]     Reason: NG TUBE PLACEMENT        
                         EXAMS:                                               
CPT CODE:      [id number] XR CHEST 1 V                               [id number]       
                    REASON FOR EXAM: NG TUBE PLACEMENT               Exam Order 
Date: 9/10/2019 3:54 PM               Ordering M.D.: Eddy Kaufman MD     
         PROCEDURE:  - XR CHEST 1 V               COMPARISON: CT of the chest 
2019 as well as chest       radiograph 2019           
   FINDINGS:         There are airspace opacities in the right upper lung and 
throughout       the left lung which have worsened from the prior radiograph. 
These       findings likely represent multifocal pneumonia. Right lower lung is 
     relatively spared.               Cardiomediastinal silhouette is normal in 
size for technique. The       mediastinal contours are within normal limits.    
          Degenerative changes are present in the spine.               Feeding 
tube terminates in the duodenum.                         IMPRESSION:         Fe
eding tube terminates in the third part of the duodenum.         Worsening pneum
onia in both lungs. This pneumonia is more pronounced         on the left side. 
        ** Electronically Signed by Jaziel Trivedi MD on 09/10/2019 at 1718 **     
                Reported and signed by: Jaziel Trivedi MD         CC: Concha Sanchez MD; Eddy Kaufman MD                                                 
                                            Technologist: Charlene Arellano RT(R)  
                                Trnscrd Date/Time/By: 09/10/2019 (1714) : By: 
[email or web address]          Orig Print D/T: S: 09/10/2019 (3878)                        
PAGE  1                       Signed Report                               GLUBED
2019-09-10 16:13:00* 



                Test Item       Value           Reference Range Comments

 

                GLUBED (test code = GLUBED) 87 mg/dL                  Perf

ormed by certified  at 

[facility]





GLUBED2019-09-10 12:16:00* 



                Test Item       Value           Reference Range Comments

 

                GLUBED (test code = GLUBED) 96 mg/dL                  Perf

ormed by certified  at 

[facility]





COMPREHENSIVE METABOLIC PANEL2019-09-10 11:35:00* 



                Test Item       Value           Reference Range Comments

 

                SODIUM (test code = NA) 142 mmol/L      136-145          

 

                POTASSIUM (test code = K) 2.9 mmol/L      3.5-5.1         Result

s called to [id number] by 

V.LAB.CF2 09/10/19 1135Critical results verified and read back by Nurse? Y

 

                CHLORIDE (test code = CL) 107.0 mmol/L               

 

                CARBON DIOXIDE (test code = CO2) 25.0 mmol/L     21-32          

  

 

                ANION GAP (test code = GAP) 12.9            10-20            

 

                GLUCOSE (test code = GLU) 104 mg/dL                  

 

                BLOOD UREA NITROGEN (test code = BUN) 4 mg/dL         7-18      

       

 

                GLOMERULAR FILTRATION RATE (test code = GFR) > 60 mL/min     >=6

0            Estimated GFR by 

using Modified MDRD formula.Chronic kidney disease is defined as either kidney 
damageor GFR <60 mL/min/1.73 m2 for >3 months.

 

                CREATININE (test code = CREAT) 0.20 mg/dL      0.55-1.02       *

*Note change in reference 

range due to change in reagent.**

 

                BUN/CREATININE RATIO (test code = BUN/CREA) 25.2            10-2

0            

 

                TOTAL PROTEIN (test code = PROT) 5.5 gram/dL     6.4-8.2        

  

 

                ALBUMIN (test code = ALB) 1.9 g/dL        3.4-5.0          

 

                GLOBULIN (test code = GLOB) 3.6 gram/dL     2.7-4.2          

 

                ALBUMIN/GLOBULIN RATIO (test code = A/G) 0.5             0.75-1.

50        

 

                CALCIUM (test code = CA) 8.0 mg/dL       8.5-10.1         

 

                BILIRUBIN TOTAL (test code = BILT) 0.60 mg/dL      0.0-1.0      

    

 

                SGOT/AST (test code = AST) 21 IUnit/L      15-37            

 

                SGPT/ALT (test code = ALT) 10 IUnit/L      12-78            

 

                ALKALINE PHOSPHATASE TOTAL (test code = ALKP) 65 IUnit/L      45

-117          **Note change in

reference range due to change in reagent.**





[id number] 11:35:00* 



                Test Item       Value           Reference Range Comments

 

                MAGNESIUM (test code = MAG) 2.1 mg/dL       1.8-2.4          





CBC W/AUTO [id number] 10:55:00* 



                Test Item       Value           Reference Range Comments

 

                WHITE BLOOD CELL (test code = WBC) 10.8 K/mm3      4.5-12.5     

    

 

                RED BLOOD CELL (test code = RBC) 3.58 mill/mm3   3.7-5.2        

  

 

                HEMOGLOBIN (test code = HGB) 11.3 gram/dL    11.5-15.5        

 

                HEMATOCRIT (test code = HCT) 33.3 %          36.0-46.0        

 

                MEAN CELL VOLUME (test code = MCV) 93.0 fL         80-98        

    

 

                MEAN CELL HGB (test code = MCH) 31.6 picogram   27.0-33.0       

 

 

                MEAN CELL HGB CONCETRATION (test code = MCHC) 33.9 gram/dL    33

.0-36.0        

 

                RED CELL DISTRIBUTION WIDTH (test code = RDW) 12.7 %          11

.6-16.2        

 

                RED CELL DISTRIBUTION WIDTH SD (test code = RDW-SD) 43.8 fL     

    37.0-51.0        

 

                PLATELET COUNT (test code = PLT) 178 K/mm3       150-450        

  

 

                MEAN PLATELET VOLUME (test code = MPV) 10.8 fL         6.7-11.0 

        

 

                NEUTROPHIL % (test code = NT%) 74.4 %          39.0-69.0        

 

                IMMATURE GRANULOCYTE % (test code = IG%) 0.6 %           0.0-5.0

          

 

                LYMPHOCYTE % (test code = LY%) 16.7 %          25.0-55.0        

 

                MONOCYTE % (test code = MO%) 5.6 %           0.0-10.0         

 

                EOSINOPHIL % (test code = EO%) 2.4 %           0.0-5.0          

 

                BASOPHIL % (test code = BA%) 0.3 %           0.0-1.0          

 

                NUCLEATED RBC % (test code = NRBC%) 0.0 %           0-0         

     

 

                NEUTROPHIL # (test code = NT#) 8.05 K/mm3      1.8-7.7          

 

                IMMATURE GRANULOCYTE # (test code = IG#) 0.06 x10 3/uL   0-0.03 

          

 

                LYMPHOCYTE # (test code = LY#) 1.80 K/mm3      1.0-5.0          

 

                MONOCYTE # (test code = MO#) 0.60 K/mm3      0-0.8            

 

                EOSINOPHIL # (test code = EO#) 0.26 K/mm3      0.0-0.5          

 

                BASOPHIL # (test code = BA#) 0.03 K/mm3      0.0-0.2          

 

                NUCLEATED RBC # (test code = NRBC#) 0.00 K/mm3      0.0-0.1     

     





[id number] 05:51:00* 



                Test Item       Value           Reference Range Comments

 

                GLUBED (test code = GLUBED) 101 mg/dL                 Perf

ormed by certified  at 

[facility]





[id number] 21:34:00* 



                Test Item       Value           Reference Range Comments

 

                GLUBED (test code = GLUBED) 89 mg/dL                  Perf

ormed by certified  at 

[facility]





CBC W/AUTO [id number] 17:15:00* 



                Test Item       Value           Reference Range Comments

 

                WHITE BLOOD CELL (test code = WBC) 13.6 K/mm3      4.5-12.5     

    

 

                RED BLOOD CELL (test code = RBC) 3.95 mill/mm3   3.7-5.2        

  

 

                HEMOGLOBIN (test code = HGB) 12.5 gram/dL    11.5-15.5        

 

                HEMATOCRIT (test code = HCT) 39.6 %          36.0-46.0        

 

                MEAN CELL VOLUME (test code = MCV) 100.3 fL        80-98        

    

 

                MEAN CELL HGB (test code = MCH) 31.6 picogram   27.0-33.0       

 

 

                MEAN CELL HGB CONCETRATION (test code = MCHC) 31.6 gram/dL    33

.0-36.0        

 

                RED CELL DISTRIBUTION WIDTH (test code = RDW) 12.9 %          11

.6-16.2        

 

                RED CELL DISTRIBUTION WIDTH SD (test code = RDW-SD) 48.0 fL     

    37.0-51.0        

 

                PLATELET COUNT (test code = PLT) 156 K/mm3       150-450        

  

 

                MEAN PLATELET VOLUME (test code = MPV) 11.1 fL         6.7-11.0 

        

 

                NEUTROPHIL % (test code = NT%) 81.2 %          39.0-69.0        

 

                IMMATURE GRANULOCYTE % (test code = IG%) 0.7 %           0.0-5.0

          

 

                LYMPHOCYTE % (test code = LY%) 12.9 %          25.0-55.0        

 

                MONOCYTE % (test code = MO%) 3.9 %           0.0-10.0         

 

                EOSINOPHIL % (test code = EO%) 0.8 %           0.0-5.0          

 

                BASOPHIL % (test code = BA%) 0.5 %           0.0-1.0          

 

                NUCLEATED RBC % (test code = NRBC%) 0.0 %           0-0         

     

 

                NEUTROPHIL # (test code = NT#) 11.08 K/mm3     1.8-7.7          

 

                IMMATURE GRANULOCYTE # (test code = IG#) 0.09 x10 3/uL   0-0.03 

          

 

                LYMPHOCYTE # (test code = LY#) 1.76 K/mm3      1.0-5.0          

 

                MONOCYTE # (test code = MO#) 0.53 K/mm3      0-0.8            

 

                EOSINOPHIL # (test code = EO#) 0.11 K/mm3      0.0-0.5          

 

                BASOPHIL # (test code = BA#) 0.07 K/mm3      0.0-0.2          

 

                NUCLEATED RBC # (test code = NRBC#) 0.00 K/mm3      0.0-0.1     

     

 

                MANUAL DIFF REQUIRED (test code = MDIFF) NO, ONLY SCAN NEEDED   

               





DIFFERENTIAL [id number] 17:15:00* 



                Test Item       Value           Reference Range Comments

 

                STAIN ACCEPTABILITY (test code = STN ACCEPTABLE) STAIN ACCEPTABL

E                  

 

                ANISOCYTOSIS (test code = ANISO) 1+                             

  

 

                MACROCYTOSIS (test code = MACR) 1+                              

 

 

                PLATELET ESTIMATE (test code = PLTEST) ADEQUATE                 

        

 

                PLATELET MORPHOLOGY (test code = PLTMORPH) NORMAL               

            





[id number] 17:10:00* 



                Test Item       Value           Reference Range Comments

 

                GLUBED (test code = GLUBED) 95 mg/dL                  Perf

ormed by certified  at 

[facility]





COMPREHENSIVE METABOLIC [id number] 15:56:00* 



                Test Item       Value           Reference Range Comments

 

                SODIUM (test code = NA) 140 mmol/L      136-145          

 

                POTASSIUM (test code = K) 3.0 mmol/L      3.5-5.1          

 

                CHLORIDE (test code = CL) 106.0 mmol/L               

 

                CARBON DIOXIDE (test code = CO2) 24.0 mmol/L     21-32          

  

 

                ANION GAP (test code = GAP) 13.0            10-20            

 

                GLUCOSE (test code = GLU) 105 mg/dL                  

 

                BLOOD UREA NITROGEN (test code = BUN) 4 mg/dL         7-18      

       

 

                GLOMERULAR FILTRATION RATE (test code = GFR) > 60 mL/min     >=6

0            Estimated GFR by 

using Modified MDRD formula.Chronic kidney disease is defined as either kidney 
damageor GFR <60 mL/min/1.73 m2 for >3 months.

 

                CREATININE (test code = CREAT) 0.20 mg/dL      0.55-1.02       *

*Note change in reference 

range due to change in reagent.**

 

                BUN/CREATININE RATIO (test code = BUN/CREA) 19.1            10-2

0            

 

                TOTAL PROTEIN (test code = PROT) 5.8 gram/dL     6.4-8.2        

  

 

                ALBUMIN (test code = ALB) 2.0 g/dL        3.4-5.0          

 

                GLOBULIN (test code = GLOB) 3.8 gram/dL     2.7-4.2          

 

                ALBUMIN/GLOBULIN RATIO (test code = A/G) 0.5             0.75-1.

50        

 

                CALCIUM (test code = CA) 7.9 mg/dL       8.5-10.1         

 

                BILIRUBIN TOTAL (test code = BILT) 0.70 mg/dL      0.0-1.0      

    

 

                SGOT/AST (test code = AST) 25 IUnit/L      15-37            

 

                SGPT/ALT (test code = ALT) 11 IUnit/L      12-78            

 

                ALKALINE PHOSPHATASE TOTAL (test code = ALKP) 69 IUnit/L      45

-117          **Note change in

reference range due to change in reagent.**





[id number] 15:56:00* 



                Test Item       Value           Reference Range Comments

 

                MAGNESIUM (test code = MAG) 1.6 mg/dL       1.8-2.4          





CBC W/AUTO [id number] 15:55:00* 



                Test Item       Value           Reference Range Comments

 

                WHITE BLOOD CELL (test code = WBC) 13.6 K/mm3      4.5-12.5     

    

 

                RED BLOOD CELL (test code = RBC) 3.95 mill/mm3   3.7-5.2        

  

 

                HEMOGLOBIN (test code = HGB) 12.5 gram/dL    11.5-15.5        

 

                HEMATOCRIT (test code = HCT) 39.6 %          36.0-46.0        

 

                MEAN CELL VOLUME (test code = MCV) 100.3 fL        80-98        

    

 

                MEAN CELL HGB (test code = MCH) 31.6 picogram   27.0-33.0       

 

 

                MEAN CELL HGB CONCETRATION (test code = MCHC) 31.6 gram/dL    33

.0-36.0        

 

                RED CELL DISTRIBUTION WIDTH (test code = RDW) 12.9 %          11

.6-16.2        

 

                RED CELL DISTRIBUTION WIDTH SD (test code = RDW-SD) 48.0 fL     

    37.0-51.0        

 

                PLATELET COUNT (test code = PLT) 156 K/mm3       150-450        

  

 

                MEAN PLATELET VOLUME (test code = MPV) 11.1 fL         6.7-11.0 

        

 

                NEUTROPHIL % (test code = NT%) 81.2 %          39.0-69.0        

 

                IMMATURE GRANULOCYTE % (test code = IG%) 0.7 %           0.0-5.0

          

 

                LYMPHOCYTE % (test code = LY%) 12.9 %          25.0-55.0        

 

                MONOCYTE % (test code = MO%) 3.9 %           0.0-10.0         

 

                EOSINOPHIL % (test code = EO%) 0.8 %           0.0-5.0          

 

                BASOPHIL % (test code = BA%) 0.5 %           0.0-1.0          

 

                NUCLEATED RBC % (test code = NRBC%) 0.0 %           0-0         

     

 

                NEUTROPHIL # (test code = NT#) 11.08 K/mm3     1.8-7.7          

 

                IMMATURE GRANULOCYTE # (test code = IG#) 0.09 x10 3/uL   0-0.03 

          

 

                LYMPHOCYTE # (test code = LY#) 1.76 K/mm3      1.0-5.0          

 

                MONOCYTE # (test code = MO#) 0.53 K/mm3      0-0.8            

 

                EOSINOPHIL # (test code = EO#) 0.11 K/mm3      0.0-0.5          

 

                BASOPHIL # (test code = BA#) 0.07 K/mm3      0.0-0.2          

 

                NUCLEATED RBC # (test code = NRBC#) 0.00 K/mm3      0.0-0.1     

     

 

                MANUAL DIFF REQUIRED (test code = MDIFF) NO, ONLY SCAN NEEDED   

               





DIFFERENTIAL [id number] 15:55:00* 



                Test Item       Value           Reference Range Comments

 

                STAIN ACCEPTABILITY (test code = STN ACCEPTABLE)                

                  

 

                CABOT RINGS (test code = CAB)                                  

 

                MORPHOLOGY COMMENT (test code = MOC)                            

      

 

                PLATELET ESTIMATE (test code = PLTEST)                          

        

 

                PLATELET MORPHOLOGY (test code = PLTMORPH)                      

            





CBC W/AUTO [id number] 15:55:00* 



                Test Item       Value           Reference Range Comments

 

                WHITE BLOOD CELL (test code = WBC) 13.6 K/mm3      4.5-12.5     

    

 

                RED BLOOD CELL (test code = RBC) 3.95 mill/mm3   3.7-5.2        

  

 

                HEMOGLOBIN (test code = HGB) 12.5 gram/dL    11.5-15.5        

 

                HEMATOCRIT (test code = HCT) 39.6 %          36.0-46.0        

 

                MEAN CELL VOLUME (test code = MCV) 100.3 fL        80-98        

    

 

                MEAN CELL HGB (test code = MCH) 31.6 picogram   27.0-33.0       

 

 

                MEAN CELL HGB CONCETRATION (test code = MCHC) 31.6 gram/dL    33

.0-36.0        

 

                RED CELL DISTRIBUTION WIDTH (test code = RDW) 12.9 %          11

.6-16.2        

 

                RED CELL DISTRIBUTION WIDTH SD (test code = RDW-SD) 48.0 fL     

    37.0-51.0        

 

                PLATELET COUNT (test code = PLT) 156 K/mm3       150-450        

  

 

                MEAN PLATELET VOLUME (test code = MPV) 11.1 fL         6.7-11.0 

        

 

                NEUTROPHIL % (test code = NT%) 81.2 %          39.0-69.0        

 

                IMMATURE GRANULOCYTE % (test code = IG%) 0.7 %           0.0-5.0

          

 

                LYMPHOCYTE % (test code = LY%) 12.9 %          25.0-55.0        

 

                MONOCYTE % (test code = MO%) 3.9 %           0.0-10.0         

 

                EOSINOPHIL % (test code = EO%) 0.8 %           0.0-5.0          

 

                BASOPHIL % (test code = BA%) 0.5 %           0.0-1.0          

 

                NUCLEATED RBC % (test code = NRBC%) 0.0 %           0-0         

     

 

                NEUTROPHIL # (test code = NT#) 11.08 K/mm3     1.8-7.7          

 

                IMMATURE GRANULOCYTE # (test code = IG#) 0.09 x10 3/uL   0-0.03 

          

 

                LYMPHOCYTE # (test code = LY#) 1.76 K/mm3      1.0-5.0          

 

                MONOCYTE # (test code = MO#) 0.53 K/mm3      0-0.8            

 

                EOSINOPHIL # (test code = EO#) 0.11 K/mm3      0.0-0.5          

 

                BASOPHIL # (test code = BA#) 0.07 K/mm3      0.0-0.2          

 

                NUCLEATED RBC # (test code = NRBC#) 0.00 K/mm3      0.0-0.1     

     

 

                MANUAL DIFF REQUIRED (test code = MDIFF) NO, ONLY SCAN NEEDED   

               





DIFFERENTIAL [id number] 15:55:00* 



                Test Item       Value           Reference Range Comments

 

                STAIN ACCEPTABILITY (test code = STN ACCEPTABLE)                

                  

 

                CABOT RINGS (test code = CAB)                                  

 

                MORPHOLOGY COMMENT (test code = MOC)                            

      

 

                PLATELET ESTIMATE (test code = PLTEST)                          

        

 

                PLATELET MORPHOLOGY (test code = PLTMORPH)                      

            





CBC W/AUTO [id number] 15:55:00* 



                Test Item       Value           Reference Range Comments

 

                WHITE BLOOD CELL (test code = WBC) 13.6 K/mm3      4.5-12.5     

    

 

                RED BLOOD CELL (test code = RBC) 3.95 mill/mm3   3.7-5.2        

  

 

                HEMOGLOBIN (test code = HGB) 12.5 gram/dL    11.5-15.5        

 

                HEMATOCRIT (test code = HCT) 39.6 %          36.0-46.0        

 

                MEAN CELL VOLUME (test code = MCV) 100.3 fL        80-98        

    

 

                MEAN CELL HGB (test code = MCH) 31.6 picogram   27.0-33.0       

 

 

                MEAN CELL HGB CONCETRATION (test code = MCHC) 31.6 gram/dL    33

.0-36.0        

 

                RED CELL DISTRIBUTION WIDTH (test code = RDW) 12.9 %          11

.6-16.2        

 

                RED CELL DISTRIBUTION WIDTH SD (test code = RDW-SD) 48.0 fL     

    37.0-51.0        

 

                PLATELET COUNT (test code = PLT) 156 K/mm3       150-450        

  

 

                MEAN PLATELET VOLUME (test code = MPV) 11.1 fL         6.7-11.0 

        

 

                NEUTROPHIL % (test code = NT%) 81.2 %          39.0-69.0        

 

                IMMATURE GRANULOCYTE % (test code = IG%) 0.7 %           0.0-5.0

          

 

                LYMPHOCYTE % (test code = LY%) 12.9 %          25.0-55.0        

 

                MONOCYTE % (test code = MO%) 3.9 %           0.0-10.0         

 

                EOSINOPHIL % (test code = EO%) 0.8 %           0.0-5.0          

 

                BASOPHIL % (test code = BA%) 0.5 %           0.0-1.0          

 

                NUCLEATED RBC % (test code = NRBC%) 0.0 %           0-0         

     

 

                NEUTROPHIL # (test code = NT#) 11.08 K/mm3     1.8-7.7          

 

                IMMATURE GRANULOCYTE # (test code = IG#) 0.09 x10 3/uL   0-0.03 

          

 

                LYMPHOCYTE # (test code = LY#) 1.76 K/mm3      1.0-5.0          

 

                MONOCYTE # (test code = MO#) 0.53 K/mm3      0-0.8            

 

                EOSINOPHIL # (test code = EO#) 0.11 K/mm3      0.0-0.5          

 

                BASOPHIL # (test code = BA#) 0.07 K/mm3      0.0-0.2          

 

                NUCLEATED RBC # (test code = NRBC#) 0.00 K/mm3      0.0-0.1     

     

 

                MANUAL DIFF REQUIRED (test code = MDIFF) NO, ONLY SCAN NEEDED   

               





DIFFERENTIAL [id number] 15:55:00* 



                Test Item       Value           Reference Range Comments

 

                STAIN ACCEPTABILITY (test code = STN ACCEPTABLE)                

                  

 

                MORPHOLOGY COMMENT (test code = MOC)                            

      

 

                PLATELET ESTIMATE (test code = PLTEST)                          

        

 

                PLATELET MORPHOLOGY (test code = PLTMORPH)                      

            





CBC W/AUTO [id number] 15:55:00* 



                Test Item       Value           Reference Range Comments

 

                WHITE BLOOD CELL (test code = WBC) 13.6 K/mm3      4.5-12.5     

    

 

                RED BLOOD CELL (test code = RBC) 3.95 mill/mm3   3.7-5.2        

  

 

                HEMOGLOBIN (test code = HGB) 12.5 gram/dL    11.5-15.5        

 

                HEMATOCRIT (test code = HCT) 39.6 %          36.0-46.0        

 

                MEAN CELL VOLUME (test code = MCV) 100.3 fL        80-98        

    

 

                MEAN CELL HGB (test code = MCH) 31.6 picogram   27.0-33.0       

 

 

                MEAN CELL HGB CONCETRATION (test code = MCHC) 31.6 gram/dL    33

.0-36.0        

 

                RED CELL DISTRIBUTION WIDTH (test code = RDW) 12.9 %          11

.6-16.2        

 

                RED CELL DISTRIBUTION WIDTH SD (test code = RDW-SD) 48.0 fL     

    37.0-51.0        

 

                PLATELET COUNT (test code = PLT) 156 K/mm3       150-450        

  

 

                MEAN PLATELET VOLUME (test code = MPV) 11.1 fL         6.7-11.0 

        

 

                NEUTROPHIL % (test code = NT%) 81.2 %          39.0-69.0        

 

                IMMATURE GRANULOCYTE % (test code = IG%) 0.7 %           0.0-5.0

          

 

                LYMPHOCYTE % (test code = LY%) 12.9 %          25.0-55.0        

 

                MONOCYTE % (test code = MO%) 3.9 %           0.0-10.0         

 

                EOSINOPHIL % (test code = EO%) 0.8 %           0.0-5.0          

 

                BASOPHIL % (test code = BA%) 0.5 %           0.0-1.0          

 

                NUCLEATED RBC % (test code = NRBC%) 0.0 %           0-0         

     

 

                NEUTROPHIL # (test code = NT#) 11.08 K/mm3     1.8-7.7          

 

                IMMATURE GRANULOCYTE # (test code = IG#) 0.09 x10 3/uL   0-0.03 

          

 

                LYMPHOCYTE # (test code = LY#) 1.76 K/mm3      1.0-5.0          

 

                MONOCYTE # (test code = MO#) 0.53 K/mm3      0-0.8            

 

                EOSINOPHIL # (test code = EO#) 0.11 K/mm3      0.0-0.5          

 

                BASOPHIL # (test code = BA#) 0.07 K/mm3      0.0-0.2          

 

                NUCLEATED RBC # (test code = NRBC#) 0.00 K/mm3      0.0-0.1     

     

 

                MANUAL DIFF REQUIRED (test code = MDIFF) NO, ONLY SCAN NEEDED   

               





DIFFERENTIAL [id number] 15:55:00* 



                Test Item       Value           Reference Range Comments

 

                STAIN ACCEPTABILITY (test code = STN ACCEPTABLE)                

                  

 

                CABOT RINGS (test code = CAB)                                  

 

                MORPHOLOGY COMMENT (test code = MOC)                            

      

 

                PLATELET ESTIMATE (test code = PLTEST)                          

        

 

                PLATELET MORPHOLOGY (test code = PLTMORPH)                      

            





CBC W/AUTO [id number] 15:51:00* 



                Test Item       Value           Reference Range Comments

 

                WHITE BLOOD CELL (test code = WBC)  K/mm3          4.5-12.5     

    

 

                RED BLOOD CELL (test code = RBC)  mill/mm3       3.7-5.2        

  

 

                HEMOGLOBIN (test code = HGB) 12.5 gram/dL    11.5-15.5        

 

                HEMATOCRIT (test code = HCT) 39.6 %          36.0-46.0        

 

                MEAN CELL VOLUME (test code = MCV)  fL             80-98        

    

 

                MEAN CELL HGB (test code = MCH)  picogram       27.0-33.0       

 

 

                MEAN CELL HGB CONCETRATION (test code = MCHC)  gram/dL        33

.0-36.0        

 

                RED CELL DISTRIBUTION WIDTH (test code = RDW)  %              11

.6-16.2        

 

                RED CELL DISTRIBUTION WIDTH SD (test code = RDW-SD)  fL         

    37.0-51.0        

 

                PLATELET COUNT (test code = PLT)  K/mm3          150-450        

  

 

                MEAN PLATELET VOLUME (test code = MPV)  fL             6.7-11.0 

        

 

                NEUTROPHIL % (test code = NT%)  %              39.0-69.0        

 

                IMMATURE GRANULOCYTE % (test code = IG%)  %              0.0-5.0

          

 

                LYMPHOCYTE % (test code = LY%)  %              25.0-55.0        

 

                MONOCYTE % (test code = MO%)  %              0.0-10.0         

 

                EOSINOPHIL % (test code = EO%)  %              0.0-5.0          

 

                BASOPHIL % (test code = BA%)  %              0.0-1.0          

 

                NEUTROPHIL # (test code = NT#)  K/mm3          1.8-7.7          

 

                LYMPHOCYTE # (test code = LY#)  K/mm3          1.0-5.0          

 

                MONOCYTE # (test code = MO#)  K/mm3          0-0.8            

 

                EOSINOPHIL # (test code = EO#)  K/mm3          0.0-0.5          

 

                BASOPHIL # (test code = BA#)  K/mm3          0.0-0.2          





COMPREHENSIVE METABOLIC [id number] 15:49:00* 



                Test Item       Value           Reference Range Comments

 

                SODIUM (test code = NA) 140 mmol/L      136-145          

 

                POTASSIUM (test code = K) 3.0 mmol/L      3.5-5.1          

 

                CHLORIDE (test code = CL) 106.0 mmol/L               

 

                CARBON DIOXIDE (test code = CO2)  mmol/L         21-32          

  

 

                ANION GAP (test code = GAP)                 10-20            

 

                GLUCOSE (test code = GLU)  mg/dL                     

 

                BLOOD UREA NITROGEN (test code = BUN)  mg/dL          7-18      

       

 

                GLOMERULAR FILTRATION RATE (test code = GFR)  mL/min         >=6

0             

 

                CREATININE (test code = CREAT)  mg/dL          0.55-1.02        

 

                BUN/CREATININE RATIO (test code = BUN/CREA)                 10-2

0            

 

                TOTAL PROTEIN (test code = PROT)  gram/dL        6.4-8.2        

  

 

                ALBUMIN (test code = ALB)  g/dL           3.4-5.0          

 

                GLOBULIN (test code = GLOB)  gram/dL        2.7-4.2          

 

                ALBUMIN/GLOBULIN RATIO (test code = A/G)                 0.75-1.

50        

 

                CALCIUM (test code = CA)  mg/dL          8.5-10.1         

 

                BILIRUBIN TOTAL (test code = BILT)  mg/dL          0.0-1.0      

    

 

                SGOT/AST (test code = AST)  IUnit/L        15-37            

 

                SGPT/ALT (test code = ALT)  IUnit/L        12-78            

 

                ALKALINE PHOSPHATASE TOTAL (test code = ALKP)  IUnit/L        45

-117           





[id number] 15:49:00* 



                Test Item       Value           Reference Range Comments

 

                MAGNESIUM (test code = MAG)  mg/dL          1.8-2.4          





[id number] 11:28:00* 



                Test Item       Value           Reference Range Comments

 

                GLUBED (test code = GLUBED) 90 mg/dL                  Perf

ormed by certified  at 

[facility]





[id number] 05:46:00* 



                Test Item       Value           Reference Range Comments

 

                GLUBED (test code = GLUBED) 92 mg/dL                  Perf

ormed by certified  at 

[facility]





[id number] 20:36:00* 



                Test Item       Value           Reference Range Comments

 

                GLUBED (test code = GLUBED) 95 mg/dL                  Perf

ormed by certified  at 

[facility]





[id number] 17:24:00* 



                Test Item       Value           Reference Range Comments

 

                GLUBED (test code = GLUBED) 85 mg/dL                  Perf

ormed by certified  at 

[facility]





COMPREHENSIVE METABOLIC [id number] 13:05:00* 



                Test Item       Value           Reference Range Comments

 

                SODIUM (test code = NA) 148 mmol/L      136-145         RESULT V

ERIFIED BY REPEAT ANALYSIS

 

                POTASSIUM (test code = K) 2.8 mmol/L      3.5-5.1         Result

s called to [id number] by 

V.LAB.LDB [id number] results verified and read back by Nurse? Y

 

                CHLORIDE (test code = CL) 112.0 mmol/L               

 

                CARBON DIOXIDE (test code = CO2) 29.0 mmol/L     21-32          

  

 

                ANION GAP (test code = GAP) 9.8             10-20            

 

                GLUCOSE (test code = GLU) 98 mg/dL                   

 

                BLOOD UREA NITROGEN (test code = BUN) 8 mg/dL         7-18      

       

 

                GLOMERULAR FILTRATION RATE (test code = GFR) > 60 mL/min     >=6

0            Estimated GFR by 

using Modified MDRD formula.Chronic kidney disease is defined as either kidney 
damageor GFR <60 mL/min/1.73 m2 for >3 months.

 

                CREATININE (test code = CREAT) 0.20 mg/dL      0.55-1.02       *

*Note change in reference 

range due to change in reagent.**

 

                BUN/CREATININE RATIO (test code = BUN/CREA) 33.3            10-2

0            

 

                TOTAL PROTEIN (test code = PROT) 5.7 gram/dL     6.4-8.2        

  

 

                ALBUMIN (test code = ALB) 1.9 g/dL        3.4-5.0          

 

                GLOBULIN (test code = GLOB) 3.8 gram/dL     2.7-4.2          

 

                ALBUMIN/GLOBULIN RATIO (test code = A/G) 0.5             0.75-1.

50        

 

                CALCIUM (test code = CA) 8.3 mg/dL       8.5-10.1         

 

                BILIRUBIN TOTAL (test code = BILT) 0.40 mg/dL      0.0-1.0      

    

 

                SGOT/AST (test code = AST) 22 IUnit/L      15-37            

 

                SGPT/ALT (test code = ALT) 10 IUnit/L      12-78            

 

                ALKALINE PHOSPHATASE TOTAL (test code = ALKP) 63 IUnit/L      45

-117          **Note change in

reference range due to change in reagent.**





CBC W/AUTO [id number] 12:03:00* 



                Test Item       Value           Reference Range Comments

 

                WHITE BLOOD CELL (test code = WBC) 13.9 K/mm3      4.5-12.5     

    

 

                RED BLOOD CELL (test code = RBC) 3.34 mill/mm3   3.7-5.2        

  

 

                HEMOGLOBIN (test code = HGB) 10.7 gram/dL    11.5-15.5        

 

                HEMATOCRIT (test code = HCT) 32.8 %          36.0-46.0        

 

                MEAN CELL VOLUME (test code = MCV) 98.2 fL         80-98        

    

 

                MEAN CELL HGB (test code = MCH) 32.0 picogram   27.0-33.0       

 

 

                MEAN CELL HGB CONCETRATION (test code = MCHC) 32.6 gram/dL    33

.0-36.0        

 

                RED CELL DISTRIBUTION WIDTH (test code = RDW) 13.1 %          11

.6-16.2        

 

                RED CELL DISTRIBUTION WIDTH SD (test code = RDW-SD) 47.0 fL     

    37.0-51.0        

 

                PLATELET COUNT (test code = PLT) 176 K/mm3       150-450        

  

 

                MEAN PLATELET VOLUME (test code = MPV) 11.0 fL         6.7-11.0 

        

 

                NEUTROPHIL % (test code = NT%) 77.3 %          39.0-69.0        

 

                IMMATURE GRANULOCYTE % (test code = IG%) 0.5 %           0.0-5.0

          

 

                LYMPHOCYTE % (test code = LY%) 16.1 %          25.0-55.0        

 

                MONOCYTE % (test code = MO%) 4.2 %           0.0-10.0         

 

                EOSINOPHIL % (test code = EO%) 1.6 %           0.0-5.0          

 

                BASOPHIL % (test code = BA%) 0.3 %           0.0-1.0          

 

                NUCLEATED RBC % (test code = NRBC%) 0.0 %           0-0         

     

 

                NEUTROPHIL # (test code = NT#) 10.75 K/mm3     1.8-7.7          

 

                IMMATURE GRANULOCYTE # (test code = IG#) 0.07 x10 3/uL   0-0.03 

          

 

                LYMPHOCYTE # (test code = LY#) 2.24 K/mm3      1.0-5.0          

 

                MONOCYTE # (test code = MO#) 0.59 K/mm3      0-0.8            

 

                EOSINOPHIL # (test code = EO#) 0.22 K/mm3      0.0-0.5          

 

                BASOPHIL # (test code = BA#) 0.04 K/mm3      0.0-0.2          

 

                NUCLEATED RBC # (test code = NRBC#) 0.00 K/mm3      0.0-0.1     

     





[id number] 11:27:00* 



                Test Item       Value           Reference Range Comments

 

                GLUBED (test code = GLUBED) 101 mg/dL                 Perf

ormed by certified  at 

[facility]





[id number] 07:09:00* 



                Test Item       Value           Reference Range Comments

 

                GLUBED (test code = GLUBED) 106 mg/dL                 Perf

ormed by certified  at 

[facility]





[id number] 21:41:00* 



                Test Item       Value           Reference Range Comments

 

                GLUBED (test code = GLUBED) 98 mg/dL                  Perf

ormed by certified  at 

[facility]





[id number] 15:34:00* 



                Test Item       Value           Reference Range Comments

 

                GLUBED (test code = GLUBED) 87 mg/dL                  Perf

ormed by certified  at 

[facility]





CBC W/MANUAL [id number] 12:59:00* 



                Test Item       Value           Reference Range Comments

 

                WHITE BLOOD CELL (test code = WBC) 12.9 K/mm3      4.5-12.5     

    

 

                RED BLOOD CELL (test code = RBC) 3.34 mill/mm3   3.7-5.2        

  

 

                HEMOGLOBIN (test code = HGB) 10.7 gram/dL    11.5-15.5        

 

                HEMATOCRIT (test code = HCT) 34.5 %          36.0-46.0        

 

                MEAN CELL VOLUME (test code = MCV) 103.3 fL        80-98        

    

 

                MEAN CELL HGB (test code = MCH) 32.0 picogram   27.0-33.0       

 

 

                MEAN CELL HGB CONCETRATION (test code = MCHC) 31.0 gram/dL    33

.0-36.0        

 

                RED CELL DISTRIBUTION WIDTH (test code = RDW) 13.6 %          11

.6-16.2        

 

                RED CELL DISTRIBUTION WIDTH SD (test code = RDW-SD) 51.8 fL     

    37.0-51.0        

 

                PLATELET COUNT (test code = PLT) 174 K/mm3       150-450        

  

 

                MEAN PLATELET VOLUME (test code = MPV) 10.8 fL         6.7-11.0 

        

 

                IMMATURE GRANULOCYTE % (test code = IG%) 0.7 %           0.0-5.0

          

 

                NUCLEATED RBC % (test code = NRBC%) 0.0 %           0-0         

     

 

                NEUTROPHIL # (test code = NT#) 9.52 K/mm3      1.8-7.7          

 

                IMMATURE GRANULOCYTE # (test code = IG#) 0.09 x10 3/uL   0-0.03 

          

 

                LYMPHOCYTE # (test code = LY#) 2.32 K/mm3      1.0-5.0          

 

                MONOCYTE # (test code = MO#) 0.67 K/mm3      0-0.8            

 

                EOSINOPHIL # (test code = EO#) 0.26 K/mm3      0.0-0.5          

 

                BASOPHIL # (test code = BA#) 0.06 K/mm3      0.0-0.2          

 

                NUCLEATED RBC # (test code = NRBC#) 0.00 K/mm3      0.0-0.1     

     

 

                MANUAL DIFF REQUIRED (test code = MDIFF) YES                    

          

 

                STAIN ACCEPTABILITY (test code = STN ACCEPTABLE) STAIN ACCEPTABL

E                  

 

                TOTAL CELLS COUNTED (test code = TCC) 115 #CELLS                

       

 

                SEGMENTED NEUTROPHILS (test code = SEG) 87.0 %          39-69   

         

 

                BAND NEUTROPHIL (test code = BAND) 0 %             0-10         

    

 

                LYMPHOCYTE (test code = LYMPH) 10.4 %          25-55            

 

                REACTIVE LYMPH (test code = RELYMPH) 0 %                        

      

 

                MONOCYTE (test code = MON) 1.7 %           0-10             

 

                EOSINOPHIL (test code = EOS) 0 %             0.0-5.0          

 

                BASOPHIL (test code = BASO) 0.9 %           0-1.0            

 

                METAMYELOCYTE (test code = META) 0 %             0-0            

  

 

                MYELOCYTE (test code = MYELO) 0 %             0.0-0.0          

 

                PROMYELOCYTE (test code = PROM) 0 %             0-0             

 

 

                MORPHOLOGY COMMENT (test code = MOC) NORMAL                     

      

 

                PLATELET ESTIMATE (test code = PLTEST) ADEQUATE                 

        

 

                PLATELET MORPHOLOGY (test code = PLTMORPH) NORMAL               

            

 

                IMMATURE FORMS (test code = IMMAT) 0 %             0-0          

    





COMPREHENSIVE METABOLIC [id number] 12:35:00* 



                Test Item       Value           Reference Range Comments

 

                SODIUM (test code = NA) 154 mmol/L      136-145          

 

                POTASSIUM (test code = K) 3.0 mmol/L      3.5-5.1          

 

                CHLORIDE (test code = CL) 122.0 mmol/L               

 

                CARBON DIOXIDE (test code = CO2) 27.0 mmol/L     21-32          

  

 

                ANION GAP (test code = GAP) 8.0             10-20            

 

                GLUCOSE (test code = GLU) 94 mg/dL                   

 

                BLOOD UREA NITROGEN (test code = BUN) 11 mg/dL        7-18      

       

 

                GLOMERULAR FILTRATION RATE (test code = GFR) > 60 mL/min     >=6

0            Estimated GFR by 

using Modified MDRD formula.Chronic kidney disease is defined as either kidney 
damageor GFR <60 mL/min/1.73 m2 for >3 months.

 

                CREATININE (test code = CREAT) 0.30 mg/dL      0.55-1.02       *

*Note change in reference 

range due to change in reagent.**

 

                BUN/CREATININE RATIO (test code = BUN/CREA) 43.0            10-2

0            

 

                TOTAL PROTEIN (test code = PROT) 5.2 gram/dL     6.4-8.2        

  

 

                ALBUMIN (test code = ALB) 2.0 g/dL        3.4-5.0          

 

                GLOBULIN (test code = GLOB) 3.2 gram/dL     2.7-4.2          

 

                ALBUMIN/GLOBULIN RATIO (test code = A/G) 0.6             0.75-1.

50        

 

                CALCIUM (test code = CA) 8.0 mg/dL       8.5-10.1         

 

                BILIRUBIN TOTAL (test code = BILT) 0.40 mg/dL      0.0-1.0      

    

 

                SGOT/AST (test code = AST) 26 IUnit/L      15-37            

 

                SGPT/ALT (test code = ALT) 13 IUnit/L      12-78            

 

                ALKALINE PHOSPHATASE TOTAL (test code = ALKP) 63 IUnit/L      45

-117          **Note change in

reference range due to change in reagent.**





CBC W/MANUAL [id number] 12:25:00* 



                Test Item       Value           Reference Range Comments

 

                WHITE BLOOD CELL (test code = WBC) 12.9 K/mm3      4.5-12.5     

    

 

                RED BLOOD CELL (test code = RBC) 3.34 mill/mm3   3.7-5.2        

  

 

                HEMOGLOBIN (test code = HGB) 10.7 gram/dL    11.5-15.5        

 

                HEMATOCRIT (test code = HCT) 34.5 %          36.0-46.0        

 

                MEAN CELL VOLUME (test code = MCV) 103.3 fL        80-98        

    

 

                MEAN CELL HGB (test code = MCH) 32.0 picogram   27.0-33.0       

 

 

                MEAN CELL HGB CONCETRATION (test code = MCHC) 31.0 gram/dL    33

.0-36.0        

 

                RED CELL DISTRIBUTION WIDTH (test code = RDW) 13.6 %          11

.6-16.2        

 

                RED CELL DISTRIBUTION WIDTH SD (test code = RDW-SD) 51.8 fL     

    37.0-51.0        

 

                PLATELET COUNT (test code = PLT) 174 K/mm3       150-450        

  

 

                MEAN PLATELET VOLUME (test code = MPV) 10.8 fL         6.7-11.0 

        

 

                IMMATURE GRANULOCYTE % (test code = IG%) 0.7 %           0.0-5.0

          

 

                NUCLEATED RBC % (test code = NRBC%) 0.0 %           0-0         

     

 

                NEUTROPHIL # (test code = NT#) 9.52 K/mm3      1.8-7.7          

 

                IMMATURE GRANULOCYTE # (test code = IG#) 0.09 x10 3/uL   0-0.03 

          

 

                LYMPHOCYTE # (test code = LY#) 2.32 K/mm3      1.0-5.0          

 

                MONOCYTE # (test code = MO#) 0.67 K/mm3      0-0.8            

 

                EOSINOPHIL # (test code = EO#) 0.26 K/mm3      0.0-0.5          

 

                BASOPHIL # (test code = BA#) 0.06 K/mm3      0.0-0.2          

 

                NUCLEATED RBC # (test code = NRBC#) 0.00 K/mm3      0.0-0.1     

     

 

                MANUAL DIFF REQUIRED (test code = MDIFF) YES                    

          

 

                STAIN ACCEPTABILITY (test code = STN ACCEPTABLE)                

                  

 

                TOTAL CELLS COUNTED (test code = TCC)  #CELLS                   

       

 

                SEGMENTED NEUTROPHILS (test code = SEG)  %              39-69   

         

 

                LYMPHOCYTE (test code = LYMPH)  %              25-55            

 

                MONOCYTE (test code = MON)  %              0-10             

 

                MORPHOLOGY COMMENT (test code = MOC)                            

      

 

                PLATELET ESTIMATE (test code = PLTEST)                          

        

 

                PLATELET MORPHOLOGY (test code = PLTMORPH)                      

            





CBC W/MANUAL [id number] 12:19:00* 



                Test Item       Value           Reference Range Comments

 

                WHITE BLOOD CELL (test code = WBC) 12.9 K/mm3      4.5-12.5     

    

 

                RED BLOOD CELL (test code = RBC) 3.34 mill/mm3   3.7-5.2        

  

 

                HEMOGLOBIN (test code = HGB) 10.7 gram/dL    11.5-15.5        

 

                HEMATOCRIT (test code = HCT) 34.5 %          36.0-46.0        

 

                MEAN CELL VOLUME (test code = MCV) 103.3 fL        80-98        

    

 

                MEAN CELL HGB (test code = MCH) 32.0 picogram   27.0-33.0       

 

 

                MEAN CELL HGB CONCETRATION (test code = MCHC) 31.0 gram/dL    33

.0-36.0        

 

                RED CELL DISTRIBUTION WIDTH (test code = RDW) 13.6 %          11

.6-16.2        

 

                RED CELL DISTRIBUTION WIDTH SD (test code = RDW-SD) 51.8 fL     

    37.0-51.0        

 

                PLATELET COUNT (test code = PLT) 174 K/mm3       150-450        

  

 

                MEAN PLATELET VOLUME (test code = MPV) 10.8 fL         6.7-11.0 

        

 

                IMMATURE GRANULOCYTE % (test code = IG%) 0.7 %           0.0-5.0

          

 

                NUCLEATED RBC % (test code = NRBC%) 0.0 %           0-0         

     

 

                NEUTROPHIL # (test code = NT#) 9.52 K/mm3      1.8-7.7          

 

                IMMATURE GRANULOCYTE # (test code = IG#) 0.09 x10 3/uL   0-0.03 

          

 

                LYMPHOCYTE # (test code = LY#) 2.32 K/mm3      1.0-5.0          

 

                MONOCYTE # (test code = MO#) 0.67 K/mm3      0-0.8            

 

                EOSINOPHIL # (test code = EO#) 0.26 K/mm3      0.0-0.5          

 

                BASOPHIL # (test code = BA#) 0.06 K/mm3      0.0-0.2          

 

                NUCLEATED RBC # (test code = NRBC#) 0.00 K/mm3      0.0-0.1     

     

 

                MANUAL DIFF REQUIRED (test code = MDIFF) YES                    

          

 

                STAIN ACCEPTABILITY (test code = STN ACCEPTABLE)                

                  

 

                TOTAL CELLS COUNTED (test code = TCC)  #CELLS                   

       

 

                SEGMENTED NEUTROPHILS (test code = SEG)  %              39-69   

         

 

                LYMPHOCYTE (test code = LYMPH)  %              25-55            

 

                MONOCYTE (test code = MON)  %              0-10             

 

                EOSINOPHIL (test code = EOS)  %              0.0-5.0          

 

                CABOT RINGS (test code = CAB)                                  

 

                MORPHOLOGY COMMENT (test code = MOC)                            

      

 

                PLATELET ESTIMATE (test code = PLTEST)                          

        

 

                PLATELET MORPHOLOGY (test code = PLTMORPH)                      

            





CBC W/MANUAL [id number] 12:19:00* 



                Test Item       Value           Reference Range Comments

 

                WHITE BLOOD CELL (test code = WBC) 12.9 K/mm3      4.5-12.5     

    

 

                RED BLOOD CELL (test code = RBC) 3.34 mill/mm3   3.7-5.2        

  

 

                HEMOGLOBIN (test code = HGB) 10.7 gram/dL    11.5-15.5        

 

                HEMATOCRIT (test code = HCT) 34.5 %          36.0-46.0        

 

                MEAN CELL VOLUME (test code = MCV) 103.3 fL        80-98        

    

 

                MEAN CELL HGB (test code = MCH) 32.0 picogram   27.0-33.0       

 

 

                MEAN CELL HGB CONCETRATION (test code = MCHC) 31.0 gram/dL    33

.0-36.0        

 

                RED CELL DISTRIBUTION WIDTH (test code = RDW) 13.6 %          11

.6-16.2        

 

                RED CELL DISTRIBUTION WIDTH SD (test code = RDW-SD) 51.8 fL     

    37.0-51.0        

 

                PLATELET COUNT (test code = PLT) 174 K/mm3       150-450        

  

 

                MEAN PLATELET VOLUME (test code = MPV) 10.8 fL         6.7-11.0 

        

 

                IMMATURE GRANULOCYTE % (test code = IG%) 0.7 %           0.0-5.0

          

 

                NUCLEATED RBC % (test code = NRBC%) 0.0 %           0-0         

     

 

                NEUTROPHIL # (test code = NT#) 9.52 K/mm3      1.8-7.7          

 

                IMMATURE GRANULOCYTE # (test code = IG#) 0.09 x10 3/uL   0-0.03 

          

 

                LYMPHOCYTE # (test code = LY#) 2.32 K/mm3      1.0-5.0          

 

                MONOCYTE # (test code = MO#) 0.67 K/mm3      0-0.8            

 

                EOSINOPHIL # (test code = EO#) 0.26 K/mm3      0.0-0.5          

 

                BASOPHIL # (test code = BA#) 0.06 K/mm3      0.0-0.2          

 

                NUCLEATED RBC # (test code = NRBC#) 0.00 K/mm3      0.0-0.1     

     

 

                MANUAL DIFF REQUIRED (test code = MDIFF) YES                    

          

 

                STAIN ACCEPTABILITY (test code = STN ACCEPTABLE)                

                  

 

                TOTAL CELLS COUNTED (test code = TCC)  #CELLS                   

       

 

                SEGMENTED NEUTROPHILS (test code = SEG)  %              39-69   

         

 

                LYMPHOCYTE (test code = LYMPH)  %              25-55            

 

                MONOCYTE (test code = MON)  %              0-10             

 

                EOSINOPHIL (test code = EOS)  %              0.0-5.0          

 

                CABOT RINGS (test code = CAB)                                  

 

                MORPHOLOGY COMMENT (test code = MOC)                            

      

 

                PLATELET ESTIMATE (test code = PLTEST)                          

        

 

                PLATELET MORPHOLOGY (test code = PLTMORPH)                      

            





CBC W/MANUAL [id number] 12:19:00* 



                Test Item       Value           Reference Range Comments

 

                WHITE BLOOD CELL (test code = WBC) 12.9 K/mm3      4.5-12.5     

    

 

                RED BLOOD CELL (test code = RBC) 3.34 mill/mm3   3.7-5.2        

  

 

                HEMOGLOBIN (test code = HGB) 10.7 gram/dL    11.5-15.5        

 

                HEMATOCRIT (test code = HCT) 34.5 %          36.0-46.0        

 

                MEAN CELL VOLUME (test code = MCV) 103.3 fL        80-98        

    

 

                MEAN CELL HGB (test code = MCH) 32.0 picogram   27.0-33.0       

 

 

                MEAN CELL HGB CONCETRATION (test code = MCHC) 31.0 gram/dL    33

.0-36.0        

 

                RED CELL DISTRIBUTION WIDTH (test code = RDW) 13.6 %          11

.6-16.2        

 

                RED CELL DISTRIBUTION WIDTH SD (test code = RDW-SD) 51.8 fL     

    37.0-51.0        

 

                PLATELET COUNT (test code = PLT) 174 K/mm3       150-450        

  

 

                MEAN PLATELET VOLUME (test code = MPV) 10.8 fL         6.7-11.0 

        

 

                IMMATURE GRANULOCYTE % (test code = IG%) 0.7 %           0.0-5.0

          

 

                NUCLEATED RBC % (test code = NRBC%) 0.0 %           0-0         

     

 

                NEUTROPHIL # (test code = NT#) 9.52 K/mm3      1.8-7.7          

 

                IMMATURE GRANULOCYTE # (test code = IG#) 0.09 x10 3/uL   0-0.03 

          

 

                LYMPHOCYTE # (test code = LY#) 2.32 K/mm3      1.0-5.0          

 

                MONOCYTE # (test code = MO#) 0.67 K/mm3      0-0.8            

 

                EOSINOPHIL # (test code = EO#) 0.26 K/mm3      0.0-0.5          

 

                BASOPHIL # (test code = BA#) 0.06 K/mm3      0.0-0.2          

 

                NUCLEATED RBC # (test code = NRBC#) 0.00 K/mm3      0.0-0.1     

     

 

                MANUAL DIFF REQUIRED (test code = MDIFF) YES                    

          

 

                STAIN ACCEPTABILITY (test code = STN ACCEPTABLE)                

                  

 

                TOTAL CELLS COUNTED (test code = TCC)  #CELLS                   

       

 

                SEGMENTED NEUTROPHILS (test code = SEG)  %              39-69   

         

 

                LYMPHOCYTE (test code = LYMPH)  %              25-55            

 

                MONOCYTE (test code = MON)  %              0-10             

 

                EOSINOPHIL (test code = EOS)  %              0.0-5.0          

 

                MORPHOLOGY COMMENT (test code = MOC)                            

      

 

                PLATELET ESTIMATE (test code = PLTEST)                          

        

 

                PLATELET MORPHOLOGY (test code = PLTMORPH)                      

            





CBC W/MANUAL [id number] 12:19:00* 



                Test Item       Value           Reference Range Comments

 

                WHITE BLOOD CELL (test code = WBC) 12.9 K/mm3      4.5-12.5     

    

 

                RED BLOOD CELL (test code = RBC) 3.34 mill/mm3   3.7-5.2        

  

 

                HEMOGLOBIN (test code = HGB) 10.7 gram/dL    11.5-15.5        

 

                HEMATOCRIT (test code = HCT) 34.5 %          36.0-46.0        

 

                MEAN CELL VOLUME (test code = MCV) 103.3 fL        80-98        

    

 

                MEAN CELL HGB (test code = MCH) 32.0 picogram   27.0-33.0       

 

 

                MEAN CELL HGB CONCETRATION (test code = MCHC) 31.0 gram/dL    33

.0-36.0        

 

                RED CELL DISTRIBUTION WIDTH (test code = RDW) 13.6 %          11

.6-16.2        

 

                RED CELL DISTRIBUTION WIDTH SD (test code = RDW-SD) 51.8 fL     

    37.0-51.0        

 

                PLATELET COUNT (test code = PLT) 174 K/mm3       150-450        

  

 

                MEAN PLATELET VOLUME (test code = MPV) 10.8 fL         6.7-11.0 

        

 

                IMMATURE GRANULOCYTE % (test code = IG%) 0.7 %           0.0-5.0

          

 

                NUCLEATED RBC % (test code = NRBC%) 0.0 %           0-0         

     

 

                NEUTROPHIL # (test code = NT#) 9.52 K/mm3      1.8-7.7          

 

                IMMATURE GRANULOCYTE # (test code = IG#) 0.09 x10 3/uL   0-0.03 

          

 

                LYMPHOCYTE # (test code = LY#) 2.32 K/mm3      1.0-5.0          

 

                MONOCYTE # (test code = MO#) 0.67 K/mm3      0-0.8            

 

                EOSINOPHIL # (test code = EO#) 0.26 K/mm3      0.0-0.5          

 

                BASOPHIL # (test code = BA#) 0.06 K/mm3      0.0-0.2          

 

                NUCLEATED RBC # (test code = NRBC#) 0.00 K/mm3      0.0-0.1     

     

 

                MANUAL DIFF REQUIRED (test code = MDIFF) YES                    

          

 

                STAIN ACCEPTABILITY (test code = STN ACCEPTABLE)                

                  

 

                TOTAL CELLS COUNTED (test code = TCC)  #CELLS                   

       

 

                SEGMENTED NEUTROPHILS (test code = SEG)  %              39-69   

         

 

                LYMPHOCYTE (test code = LYMPH)  %              25-55            

 

                MONOCYTE (test code = MON)  %              0-10             

 

                EOSINOPHIL (test code = EOS)  %              0.0-5.0          

 

                CABOT RINGS (test code = CAB)                                  

 

                MORPHOLOGY COMMENT (test code = MOC)                            

      

 

                PLATELET ESTIMATE (test code = PLTEST)                          

        

 

                PLATELET MORPHOLOGY (test code = PLTMORPH)                      

            





[id number] 10:44:00* 



                Test Item       Value           Reference Range Comments

 

                GLUBED (test code = GLUBED) 85 mg/dL                  Perf

ormed by certified  at 

[facility]





[id number] 06:09:00* 



                Test Item       Value           Reference Range Comments

 

                GLUBED (test code = GLUBED) 84 mg/dL                  Perf

ormed by certified  at 

[facility]





- CT CHEST [id number] 22:11:00  Name: MELANY NEGRETE                    
 [facility]                     : 1965 Age/S: 54  / F         [address] 
[facility]                Unit #: [id number]     Loc:               [place]  [address]              Phys: Chinyere Massey MD                                 
                Acct: [id number]  Dis Date:               Status: ADM IN      
                           PHONE #: [phone]     Exam Date: 2019
                    FAX #: [phone]      Reason: fever,sob                  
                        EXAMS:                                               CPT
CODE:      [id number] CT CHEST W/CONTRAST                        [id number]           
        HISTORY: Fever and shortness of breath.               COMPARISON: None 
available.               CT chest without contrast: Automated exposure control. 
             Unremarkable pulmonary arteries (not performed as PE protocol).    
   Unremarkable aorta without aneurysm or dissection.  Well-opacified SVC       
and neck vasculature.               Thyroid glands are unremarkable.  Esophageal
wall is not thickened.        Shotty adenopathy.  No pathologic adenopathy.  
Cardiac silhouette is       normal.  No pericardial effusion.               
Visualized upper abdomen demonstrating multiple large gallstones       within 
the moderately distended gallbladder.  Moderately hyperplastic       adrenals as
well.               Subcutaneous tissues and the musculature are normal in 
appearance.        Dense consolidation in the left lung with patchy right lung  
    consolidation with upper lobe predominance.  Lung scarring.  Left       
basal subsegmental atelectasis with loss of lung volume.  Small       effusion. 
No bronchiectasis, honeycombing or fibrosis.                 IMPRESSION:        
          Dense left lung consolidation with loss of lung volume and left lower 
       lobe segmental atelectasis.  Patchy vague right lung infiltrate.  Lung   
     scarring.  No pathologic adenopathy.  Multiple large gallstones.          
** Electronically Signed by LIVE Thomas on 2019 at 2211 **          
           Reported and signed by: Khris Thomas M.D.          CC: 
Alissa Sanchez MD; Chinyere Massey MD                                          
                                                       Technologist:Matthew Joyce, RT(R)(CT); May  CTDI:        DLP:        Trnscb Date/Time: 2019 
() t.[email or web address]                        Orig Print D/T: S: 2019 (8301)    
 PAGE  1                       Signed Report                               
[id number] 20:54:00* 



                Test Item       Value           Reference Range Comments

 

                GLUBED (test code = GLUBED) 90 mg/dL                  Perf

ormed by certified  at 

[facility]





[id number] 16:37:00* 



                Test Item       Value           Reference Range Comments

 

                GLUBED (test code = GLUBED) 91 mg/dL                  Perf

ormed by certified  at 

[facility]





[id number] 12:08:00* 



                Test Item       Value           Reference Range Comments

 

                GLUBED (test code = GLUBED) 78 mg/dL                  Perf

ormed by certified  at 

[facility]





BASIC METABOLIC [id number] 11:28:00* 



                Test Item       Value           Reference Range Comments

 

                SODIUM (test code = NA) 156 mmol/L      136-145         Results 

called to [id number] by 

V.[facility] 19 1128Critical results verified and read back by Nurse? Y

 

                POTASSIUM (test code = K) 3.7 mmol/L      3.5-5.1          

 

                CHLORIDE (test code = CL) 125.0 mmol/L               

 

                CARBON DIOXIDE (test code = CO2) 25.0 mmol/L     21-32          

  

 

                ANION GAP (test code = GAP) 9.7             10-20            

 

                GLUCOSE (test code = GLU) 69 mg/dL                   

 

                BLOOD UREA NITROGEN (test code = BUN) 20 mg/dL        7-18      

       

 

                GLOMERULAR FILTRATION RATE (test code = GFR) > 60 mL/min     >=6

0            Estimated GFR by 

using Modified MDRD formula.Chronic kidney disease is defined as either kidney 
damageor GFR <60 mL/min/1.73 m2 for >3 months.

 

                CREATININE (test code = CREAT) 0.30 mg/dL      0.55-1.02       *

*Note change in reference 

range due to change in reagent.**

 

                BUN/CREATININE RATIO (test code = BUN/CREA) 73.3            10-2

0            

 

                CALCIUM (test code = CA) 8.4 mg/dL       8.5-10.1         





PROCALCITONIN (PCT)2019 11:10:00* 



                Test Item       Value           Reference Range Comments

 

                PROCALCITONIN (PCT) (test code = PROCAL) 0.91 ng/ml             

         Concentration   

Interpretation   (ng/mL)      -------------   
--------------------------------------------<0.51           Sepsis is not 
likely.                Local bacterial infection is possible.                
(LOW RISK for progression to Sepsis) 0.51 - 2.00     Sepsis is possible, but 
other conditions                are known to elevate PCT as well.               
(MODERATE RISK for progression to Sepsis) > 2.00          Sepsis is likely, 
unless other causes are                known.                (HIGH RISK for 
progression to Severe Sepsis                or Septic Shock) 10.00           
High likelihood of Severe Sepsis or Septic  or higher     Shock.                
*Increased PCT levels may not always be related to systemic bacterial 
infection.*Low PCT levels do not automatically exclude the presence of bacterial
infection.*All results should be interpreted taking into account the  patients 
history.





CBC W/MANUAL [id number] 11:00:00* 



                Test Item       Value           Reference Range Comments

 

                WHITE BLOOD CELL (test code = WBC) 9.8 K/mm3       4.5-12.5     

    

 

                RED BLOOD CELL (test code = RBC) 3.42 mill/mm3   3.7-5.2        

  

 

                HEMOGLOBIN (test code = HGB) 10.9 gram/dL    11.5-15.5       RES

ULT VERIFIED BY REPEAT 

ANALYSIS

 

                HEMATOCRIT (test code = HCT) 35.5 %          36.0-46.0        

 

                MEAN CELL VOLUME (test code = MCV) 103.8 fL        80-98        

    

 

                MEAN CELL HGB (test code = MCH) 31.9 picogram   27.0-33.0       

 

 

                MEAN CELL HGB CONCETRATION (test code = MCHC) 30.7 gram/dL    33

.0-36.0        

 

                RED CELL DISTRIBUTION WIDTH (test code = RDW) 14.0 %          11

.6-16.2        

 

                RED CELL DISTRIBUTION WIDTH SD (test code = RDW-SD) 54.0 fL     

    37.0-51.0        

 

                PLATELET COUNT (test code = PLT) 189 K/mm3       150-450        

 RESULT VERIFIED BY REPEAT 

ANALYSIS

 

                MEAN PLATELET VOLUME (test code = MPV) 11.6 fL         6.7-11.0 

        

 

                IMMATURE GRANULOCYTE % (test code = IG%) 0.5 %           0.0-5.0

          

 

                NUCLEATED RBC % (test code = NRBC%) 0.0 %           0-0         

     

 

                NEUTROPHIL # (test code = NT#) 6.75 K/mm3      1.8-7.7          

 

                IMMATURE GRANULOCYTE # (test code = IG#) 0.05 x10 3/uL   0-0.03 

          

 

                LYMPHOCYTE # (test code = LY#) 2.41 K/mm3      1.0-5.0          

 

                MONOCYTE # (test code = MO#) 0.54 K/mm3      0-0.8            

 

                EOSINOPHIL # (test code = EO#) 0.03 K/mm3      0.0-0.5          

 

                BASOPHIL # (test code = BA#) 0.02 K/mm3      0.0-0.2          

 

                NUCLEATED RBC # (test code = NRBC#) 0.00 K/mm3      0.0-0.1     

     

 

                MANUAL DIFF REQUIRED (test code = MDIFF) YES                    

          

 

                STAIN ACCEPTABILITY (test code = STN ACCEPTABLE) STAIN ACCEPTABL

E                  

 

                TOTAL CELLS COUNTED (test code = TCC) 114 #CELLS                

       

 

                SEGMENTED NEUTROPHILS (test code = SEG) 78.1 %          39-69   

         

 

                BAND NEUTROPHIL (test code = BAND) 0 %             0-10         

    

 

                LYMPHOCYTE (test code = LYMPH) 11.4 %          25-55            

 

                REACTIVE LYMPH (test code = RELYMPH) 4.4 %                      

      

 

                MONOCYTE (test code = MON) 0.9 %           0-10             

 

                EOSINOPHIL (test code = EOS) 2.6 %           0.0-5.0          

 

                BASOPHIL (test code = BASO) 2.6 %           0-1.0            

 

                METAMYELOCYTE (test code = META) 0 %             0-0            

  

 

                MYELOCYTE (test code = MYELO) 0 %             0.0-0.0          

 

                PROMYELOCYTE (test code = PROM) 0 %             0-0             

 

 

                PLATELET ESTIMATE (test code = PLTEST) ADEQUATE                 

        

 

                PLATELET MORPHOLOGY (test code = PLTMORPH) NORMAL               

            

 

                IMMATURE FORMS (test code = IMMAT) 0 %             0-0          

    





CBC W/MANUAL [id number] 10:24:00* 



                Test Item       Value           Reference Range Comments

 

                WHITE BLOOD CELL (test code = WBC) 9.8 K/mm3       4.5-12.5     

    

 

                RED BLOOD CELL (test code = RBC) 3.42 mill/mm3   3.7-5.2        

  

 

                HEMOGLOBIN (test code = HGB) 10.9 gram/dL    11.5-15.5       RES

ULT VERIFIED BY REPEAT 

ANALYSIS

 

                HEMATOCRIT (test code = HCT) 35.5 %          36.0-46.0        

 

                MEAN CELL VOLUME (test code = MCV) 103.8 fL        80-98        

    

 

                MEAN CELL HGB (test code = MCH) 31.9 picogram   27.0-33.0       

 

 

                MEAN CELL HGB CONCETRATION (test code = MCHC) 30.7 gram/dL    33

.0-36.0        

 

                RED CELL DISTRIBUTION WIDTH (test code = RDW) 14.0 %          11

.6-16.2        

 

                RED CELL DISTRIBUTION WIDTH SD (test code = RDW-SD) 54.0 fL     

    37.0-51.0        

 

                PLATELET COUNT (test code = PLT) 189 K/mm3       150-450        

 RESULT VERIFIED BY REPEAT 

ANALYSIS

 

                MEAN PLATELET VOLUME (test code = MPV) 11.6 fL         6.7-11.0 

        

 

                IMMATURE GRANULOCYTE % (test code = IG%) 0.5 %           0.0-5.0

          

 

                NUCLEATED RBC % (test code = NRBC%) 0.0 %           0-0         

     

 

                NEUTROPHIL # (test code = NT#) 6.75 K/mm3      1.8-7.7          

 

                IMMATURE GRANULOCYTE # (test code = IG#) 0.05 x10 3/uL   0-0.03 

          

 

                LYMPHOCYTE # (test code = LY#) 2.41 K/mm3      1.0-5.0          

 

                MONOCYTE # (test code = MO#) 0.54 K/mm3      0-0.8            

 

                EOSINOPHIL # (test code = EO#) 0.03 K/mm3      0.0-0.5          

 

                BASOPHIL # (test code = BA#) 0.02 K/mm3      0.0-0.2          

 

                NUCLEATED RBC # (test code = NRBC#) 0.00 K/mm3      0.0-0.1     

     

 

                MANUAL DIFF REQUIRED (test code = MDIFF) YES                    

          

 

                STAIN ACCEPTABILITY (test code = STN ACCEPTABLE)                

                  

 

                TOTAL CELLS COUNTED (test code = TCC)  #CELLS                   

       

 

                SEGMENTED NEUTROPHILS (test code = SEG)  %              39-69   

         

 

                LYMPHOCYTE (test code = LYMPH)  %              25-55            

 

                MONOCYTE (test code = MON)  %              0-10             

 

                EOSINOPHIL (test code = EOS)  %              0.0-5.0          

 

                CABOT RINGS (test code = CAB)                                  

 

                MORPHOLOGY COMMENT (test code = MOC)                            

      

 

                PLATELET ESTIMATE (test code = PLTEST)                          

        

 

                PLATELET MORPHOLOGY (test code = PLTMORPH)                      

            





CBC W/MANUAL [id number] 10:24:00* 



                Test Item       Value           Reference Range Comments

 

                WHITE BLOOD CELL (test code = WBC) 9.8 K/mm3       4.5-12.5     

    

 

                RED BLOOD CELL (test code = RBC) 3.42 mill/mm3   3.7-5.2        

  

 

                HEMOGLOBIN (test code = HGB) 10.9 gram/dL    11.5-15.5       RES

ULT VERIFIED BY REPEAT 

ANALYSIS

 

                HEMATOCRIT (test code = HCT) 35.5 %          36.0-46.0        

 

                MEAN CELL VOLUME (test code = MCV) 103.8 fL        80-98        

    

 

                MEAN CELL HGB (test code = MCH) 31.9 picogram   27.0-33.0       

 

 

                MEAN CELL HGB CONCETRATION (test code = MCHC) 30.7 gram/dL    33

.0-36.0        

 

                RED CELL DISTRIBUTION WIDTH (test code = RDW) 14.0 %          11

.6-16.2        

 

                RED CELL DISTRIBUTION WIDTH SD (test code = RDW-SD) 54.0 fL     

    37.0-51.0        

 

                PLATELET COUNT (test code = PLT) 189 K/mm3       150-450        

 RESULT VERIFIED BY REPEAT 

ANALYSIS

 

                MEAN PLATELET VOLUME (test code = MPV) 11.6 fL         6.7-11.0 

        

 

                IMMATURE GRANULOCYTE % (test code = IG%) 0.5 %           0.0-5.0

          

 

                NUCLEATED RBC % (test code = NRBC%) 0.0 %           0-0         

     

 

                NEUTROPHIL # (test code = NT#) 6.75 K/mm3      1.8-7.7          

 

                IMMATURE GRANULOCYTE # (test code = IG#) 0.05 x10 3/uL   0-0.03 

          

 

                LYMPHOCYTE # (test code = LY#) 2.41 K/mm3      1.0-5.0          

 

                MONOCYTE # (test code = MO#) 0.54 K/mm3      0-0.8            

 

                EOSINOPHIL # (test code = EO#) 0.03 K/mm3      0.0-0.5          

 

                BASOPHIL # (test code = BA#) 0.02 K/mm3      0.0-0.2          

 

                NUCLEATED RBC # (test code = NRBC#) 0.00 K/mm3      0.0-0.1     

     

 

                MANUAL DIFF REQUIRED (test code = MDIFF) YES                    

          

 

                STAIN ACCEPTABILITY (test code = STN ACCEPTABLE)                

                  

 

                TOTAL CELLS COUNTED (test code = TCC)  #CELLS                   

       

 

                SEGMENTED NEUTROPHILS (test code = SEG)  %              39-69   

         

 

                LYMPHOCYTE (test code = LYMPH)  %              25-55            

 

                MONOCYTE (test code = MON)  %              0-10             

 

                EOSINOPHIL (test code = EOS)  %              0.0-5.0          

 

                MORPHOLOGY COMMENT (test code = MOC)                            

      

 

                PLATELET ESTIMATE (test code = PLTEST)                          

        

 

                PLATELET MORPHOLOGY (test code = PLTMORPH)                      

            





CBC W/MANUAL [id number] 10:24:00* 



                Test Item       Value           Reference Range Comments

 

                WHITE BLOOD CELL (test code = WBC) 9.8 K/mm3       4.5-12.5     

    

 

                RED BLOOD CELL (test code = RBC) 3.42 mill/mm3   3.7-5.2        

  

 

                HEMOGLOBIN (test code = HGB) 10.9 gram/dL    11.5-15.5       RES

ULT VERIFIED BY REPEAT 

ANALYSIS

 

                HEMATOCRIT (test code = HCT) 35.5 %          36.0-46.0        

 

                MEAN CELL VOLUME (test code = MCV) 103.8 fL        80-98        

    

 

                MEAN CELL HGB (test code = MCH) 31.9 picogram   27.0-33.0       

 

 

                MEAN CELL HGB CONCETRATION (test code = MCHC) 30.7 gram/dL    33

.0-36.0        

 

                RED CELL DISTRIBUTION WIDTH (test code = RDW) 14.0 %          11

.6-16.2        

 

                RED CELL DISTRIBUTION WIDTH SD (test code = RDW-SD) 54.0 fL     

    37.0-51.0        

 

                PLATELET COUNT (test code = PLT) 189 K/mm3       150-450        

 RESULT VERIFIED BY REPEAT 

ANALYSIS

 

                MEAN PLATELET VOLUME (test code = MPV) 11.6 fL         6.7-11.0 

        

 

                IMMATURE GRANULOCYTE % (test code = IG%) 0.5 %           0.0-5.0

          

 

                NUCLEATED RBC % (test code = NRBC%) 0.0 %           0-0         

     

 

                NEUTROPHIL # (test code = NT#) 6.75 K/mm3      1.8-7.7          

 

                IMMATURE GRANULOCYTE # (test code = IG#) 0.05 x10 3/uL   0-0.03 

          

 

                LYMPHOCYTE # (test code = LY#) 2.41 K/mm3      1.0-5.0          

 

                MONOCYTE # (test code = MO#) 0.54 K/mm3      0-0.8            

 

                EOSINOPHIL # (test code = EO#) 0.03 K/mm3      0.0-0.5          

 

                BASOPHIL # (test code = BA#) 0.02 K/mm3      0.0-0.2          

 

                NUCLEATED RBC # (test code = NRBC#) 0.00 K/mm3      0.0-0.1     

     

 

                MANUAL DIFF REQUIRED (test code = MDIFF) YES                    

          

 

                STAIN ACCEPTABILITY (test code = STN ACCEPTABLE)                

                  

 

                TOTAL CELLS COUNTED (test code = TCC)  #CELLS                   

       

 

                SEGMENTED NEUTROPHILS (test code = SEG)  %              39-69   

         

 

                LYMPHOCYTE (test code = LYMPH)  %              25-55            

 

                MONOCYTE (test code = MON)  %              0-10             

 

                MORPHOLOGY COMMENT (test code = MOC)                            

      

 

                PLATELET ESTIMATE (test code = PLTEST)                          

        

 

                PLATELET MORPHOLOGY (test code = PLTMORPH)                      

            





CBC W/MANUAL [id number] 10:23:00* 



                Test Item       Value           Reference Range Comments

 

                WHITE BLOOD CELL (test code = WBC) 9.8 K/mm3       4.5-12.5     

    

 

                RED BLOOD CELL (test code = RBC) 3.42 mill/mm3   3.7-5.2        

  

 

                HEMOGLOBIN (test code = HGB) 10.9 gram/dL    11.5-15.5       RES

ULT VERIFIED BY REPEAT 

ANALYSIS

 

                HEMATOCRIT (test code = HCT) 35.5 %          36.0-46.0        

 

                MEAN CELL VOLUME (test code = MCV) 103.8 fL        80-98        

    

 

                MEAN CELL HGB (test code = MCH) 31.9 picogram   27.0-33.0       

 

 

                MEAN CELL HGB CONCETRATION (test code = MCHC) 30.7 gram/dL    33

.0-36.0        

 

                RED CELL DISTRIBUTION WIDTH (test code = RDW) 14.0 %          11

.6-16.2        

 

                RED CELL DISTRIBUTION WIDTH SD (test code = RDW-SD) 54.0 fL     

    37.0-51.0        

 

                PLATELET COUNT (test code = PLT) 189 K/mm3       150-450        

 RESULT VERIFIED BY REPEAT 

ANALYSIS

 

                MEAN PLATELET VOLUME (test code = MPV) 11.6 fL         6.7-11.0 

        

 

                IMMATURE GRANULOCYTE % (test code = IG%) 0.5 %           0.0-5.0

          

 

                NUCLEATED RBC % (test code = NRBC%) 0.0 %           0-0         

     

 

                NEUTROPHIL # (test code = NT#) 6.75 K/mm3      1.8-7.7          

 

                IMMATURE GRANULOCYTE # (test code = IG#) 0.05 x10 3/uL   0-0.03 

          

 

                LYMPHOCYTE # (test code = LY#) 2.41 K/mm3      1.0-5.0          

 

                MONOCYTE # (test code = MO#) 0.54 K/mm3      0-0.8            

 

                EOSINOPHIL # (test code = EO#) 0.03 K/mm3      0.0-0.5          

 

                BASOPHIL # (test code = BA#) 0.02 K/mm3      0.0-0.2          

 

                NUCLEATED RBC # (test code = NRBC#) 0.00 K/mm3      0.0-0.1     

     

 

                MANUAL DIFF REQUIRED (test code = MDIFF) YES                    

          

 

                STAIN ACCEPTABILITY (test code = STN ACCEPTABLE)                

                  

 

                TOTAL CELLS COUNTED (test code = TCC)  #CELLS                   

       

 

                SEGMENTED NEUTROPHILS (test code = SEG)  %              39-69   

         

 

                LYMPHOCYTE (test code = LYMPH)  %              25-55            

 

                MONOCYTE (test code = MON)  %              0-10             

 

                EOSINOPHIL (test code = EOS)  %              0.0-5.0          

 

                CABOT RINGS (test code = CAB)                                  

 

                MORPHOLOGY COMMENT (test code = MOC)                            

      

 

                PLATELET ESTIMATE (test code = PLTEST)                          

        

 

                PLATELET MORPHOLOGY (test code = PLTMORPH)                      

            





CBC W/MANUAL [id number] 10:23:00* 



                Test Item       Value           Reference Range Comments

 

                WHITE BLOOD CELL (test code = WBC) 9.8 K/mm3       4.5-12.5     

    

 

                RED BLOOD CELL (test code = RBC) 3.42 mill/mm3   3.7-5.2        

  

 

                HEMOGLOBIN (test code = HGB) 10.9 gram/dL    11.5-15.5       RES

ULT VERIFIED BY REPEAT 

ANALYSIS

 

                HEMATOCRIT (test code = HCT) 35.5 %          36.0-46.0        

 

                MEAN CELL VOLUME (test code = MCV) 103.8 fL        80-98        

    

 

                MEAN CELL HGB (test code = MCH) 31.9 picogram   27.0-33.0       

 

 

                MEAN CELL HGB CONCETRATION (test code = MCHC) 30.7 gram/dL    33

.0-36.0        

 

                RED CELL DISTRIBUTION WIDTH (test code = RDW) 14.0 %          11

.6-16.2        

 

                RED CELL DISTRIBUTION WIDTH SD (test code = RDW-SD) 54.0 fL     

    37.0-51.0        

 

                PLATELET COUNT (test code = PLT) 189 K/mm3       150-450        

 RESULT VERIFIED BY REPEAT 

ANALYSIS

 

                MEAN PLATELET VOLUME (test code = MPV) 11.6 fL         6.7-11.0 

        

 

                IMMATURE GRANULOCYTE % (test code = IG%) 0.5 %           0.0-5.0

          

 

                NUCLEATED RBC % (test code = NRBC%) 0.0 %           0-0         

     

 

                NEUTROPHIL # (test code = NT#) 6.75 K/mm3      1.8-7.7          

 

                IMMATURE GRANULOCYTE # (test code = IG#) 0.05 x10 3/uL   0-0.03 

          

 

                LYMPHOCYTE # (test code = LY#) 2.41 K/mm3      1.0-5.0          

 

                MONOCYTE # (test code = MO#) 0.54 K/mm3      0-0.8            

 

                EOSINOPHIL # (test code = EO#) 0.03 K/mm3      0.0-0.5          

 

                BASOPHIL # (test code = BA#) 0.02 K/mm3      0.0-0.2          

 

                NUCLEATED RBC # (test code = NRBC#) 0.00 K/mm3      0.0-0.1     

     

 

                MANUAL DIFF REQUIRED (test code = MDIFF) YES                    

          

 

                STAIN ACCEPTABILITY (test code = STN ACCEPTABLE)                

                  

 

                TOTAL CELLS COUNTED (test code = TCC)  #CELLS                   

       

 

                SEGMENTED NEUTROPHILS (test code = SEG)  %              39-69   

         

 

                LYMPHOCYTE (test code = LYMPH)  %              25-55            

 

                MONOCYTE (test code = MON)  %              0-10             

 

                EOSINOPHIL (test code = EOS)  %              0.0-5.0          

 

                CABOT RINGS (test code = CAB)                                  

 

                MORPHOLOGY COMMENT (test code = MOC)                            

      

 

                PLATELET ESTIMATE (test code = PLTEST)                          

        

 

                PLATELET MORPHOLOGY (test code = PLTMORPH)                      

            





[id number] 06:49:00* 



                Test Item       Value           Reference Range Comments

 

                GLUBED (test code = GLUBED) 73 mg/dL                  Perf

ormed by certified  at 

[facility]





[id number] 05:25:00* 



                Test Item       Value           Reference Range Comments

 

                GLUBED (test code = GLUBED) 66 mg/dL                  Perf

ormed by certified  at 

[facility]





[id number] 23:34:00* 



                Test Item       Value           Reference Range Comments

 

                GLUBED (test code = GLUBED) 70 mg/dL                  Perf

ormed by certified  at 

[facility]





[id number] 16:45:00* 



                Test Item       Value           Reference Range Comments

 

                GLUBED (test code = GLUBED) 86 mg/dL                  Perf

ormed by certified  at 

[facility]





[id number] 11:49:00* 



                Test Item       Value           Reference Range Comments

 

                GLUBED (test code = GLUBED) 101 mg/dL                 Perf

ormed by certified  at 

[facility]





LACTIC [id number] 09:00:00* 



                Test Item       Value           Reference Range Comments

 

                LACTIC ACID (test code = LACT) 1.1 mmol/L      0.4-1.9          





[id number] 08:14:00* 



                Test Item       Value           Reference Range Comments

 

                GLUBED (test code = GLUBED) 101 mg/dL                 Perf

ormed by certified  at 

[facility]





LACTIC [id number] 03:25:00* 



                Test Item       Value           Reference Range Comments

 

                LACTIC ACID (test code = LACT) 2.0 mmol/L      0.4-1.9         R

esults called to [id number] by 

JADA 19 [id number] results verified and read back by Nurse? Y





ARTERIAL BLOOD [id number] 21:46:00* 



                Test Item       Value           Reference Range Comments

 

                ARTERIAL BLOOD GAS PH (test code = PHA) 7.42            7.35-7.4

5        

 

                ARTERIAL BLOOD GAS PCO2 (test code = PCO2A) 35.6 mm Hg      35-4

5            

 

                ARTERIAL BLOOD GAS PO2 (test code = PO2A) 68.6 mmHg       

           

 

                BICARBONATE TOTAL HCO3 (test code = HCO3) 22.6 mmol/L     23.0-2

7.0        

 

                BASE EXCESS (test code = BRADLEY) -1.4 mmol/L     -3.0-5.0         

 

                ABG O2 SATURATION (test code = SATA) 93.1 %          90.0-98.0  

      

 

                ABG TYPE (test code = TYPEA) Arterial                         

 

                FIO2 (test code = FIO2A) 100.0                            

 

                ABG SITE (test code = SITEA) Rt RADIAL ARTERY                  

 

                MODIFIED ALLENS (test code = MODALL) Yes CHECK       PERFORMED  

      

 

                SODIUM (test code = NA/ABG) 146.6 mEq/L     135-148          

 

                POTASSIUM (test code = K/ABG) 3.5 mEq/L       3.5-4.5          

 

                CHLORIDE (test code = CL/ABG) 115 mEq/L                  

 

                GLUCOSE (test code = GLU/ABG) 139 mg/dL       74-99            

 

                HEMATOCRIT (test code = HCT/ABG) 36 %            35-47          

  

 

                IONIZED CALCIUM (test code = CAIABG) 1.16 mmol/L     1.1-1.37   

      

 

                TOTAL HGB (test code = THB) 12.1 gram/dL    11.5-15.5        

 

                HGB O2 SAT (test code = HBOSAT) 92.2 %          94.00-98.00     

 

 

                CARBOXYHEMOGLOBIN (test code = HOHGBT) 0.4 %totalHg    0.5-1.5  

       Results called to 

and read back by Dr. DEJESUSat 21:45 - 2019; by Camille

 

                METHEMOGLOBIN (test code = METHGB) 0.6 %           0.0-1.50     

    

 

                O2 CONTENT (test code = O2CT) 15.7 % vol      18.0-22.0        





CBC W/MANUAL [id number] 21:46:00* 



                Test Item       Value           Reference Range Comments

 

                WHITE BLOOD CELL (test code = WBC) 13.7 K/mm3      4.5-12.5     

    

 

                RED BLOOD CELL (test code = RBC) 4.30 mill/mm3   3.7-5.2        

  

 

                HEMOGLOBIN (test code = HGB) 13.7 gram/dL    11.5-15.5        

 

                HEMATOCRIT (test code = HCT) 42.6 %          36.0-46.0        

 

                MEAN CELL VOLUME (test code = MCV) 99.1 fL         80-98        

    

 

                MEAN CELL HGB (test code = MCH) 31.9 picogram   27.0-33.0       

 

 

                MEAN CELL HGB CONCETRATION (test code = MCHC) 32.2 gram/dL    33

.0-36.0        

 

                RED CELL DISTRIBUTION WIDTH (test code = RDW) 13.7 %          11

.6-16.2        

 

                RED CELL DISTRIBUTION WIDTH SD (test code = RDW-SD) 50.2 fL     

    37.0-51.0        

 

                PLATELET COUNT (test code = PLT) 277 K/mm3       150-450        

  

 

                MEAN PLATELET VOLUME (test code = MPV) 11.3 fL         6.7-11.0 

        

 

                IMMATURE GRANULOCYTE % (test code = IG%) 0.7 %           0.0-5.0

          

 

                NUCLEATED RBC % (test code = NRBC%) 0.0 %           0-0         

     

 

                NEUTROPHIL # (test code = NT#) 11.09 K/mm3     1.8-7.7          

 

                IMMATURE GRANULOCYTE # (test code = IG#) 0.09 x10 3/uL   0-0.03 

          

 

                LYMPHOCYTE # (test code = LY#) 1.53 K/mm3      1.0-5.0          

 

                MONOCYTE # (test code = MO#) 0.96 K/mm3      0-0.8            

 

                EOSINOPHIL # (test code = EO#) 0.00 K/mm3      0.0-0.5          

 

                BASOPHIL # (test code = BA#) 0.05 K/mm3      0.0-0.2          

 

                NUCLEATED RBC # (test code = NRBC#) 0.00 K/mm3      0.0-0.1     

     

 

                MANUAL DIFF REQUIRED (test code = MDIFF) YES                    

          

 

                STAIN ACCEPTABILITY (test code = STN ACCEPTABLE) STAIN ACCEPTABL

E                  

 

                TOTAL CELLS COUNTED (test code = TCC) 115 #CELLS                

       

 

                SEGMENTED NEUTROPHILS (test code = SEG) 79.1 %          39-69   

         

 

                BAND NEUTROPHIL (test code = BAND) 3.5 %           0-10         

    

 

                LYMPHOCYTE (test code = LYMPH) 13.0 %          25-55            

 

                REACTIVE LYMPH (test code = RELYMPH) 0 %                        

      

 

                MONOCYTE (test code = MON) 4.4 %           0-10             

 

                EOSINOPHIL (test code = EOS) 0 %             0.0-5.0          

 

                BASOPHIL (test code = BASO) 0 %             0-1.0            

 

                METAMYELOCYTE (test code = META) 0 %             0-0            

  

 

                MYELOCYTE (test code = MYELO) 0 %             0.0-0.0          

 

                PROMYELOCYTE (test code = PROM) 0 %             0-0             

 

 

                MORPHOLOGY COMMENT (test code = MOC) NORMAL                     

      

 

                PLATELET ESTIMATE (test code = PLTEST) ADEQUATE                 

        

 

                PLATELET MORPHOLOGY (test code = PLTMORPH) NORMAL               

            

 

                IMMATURE FORMS (test code = IMMAT) 0 %             0-0          

    





URINALYSIS [id number] 21:33:00* 



                Test Item       Value           Reference Range Comments

 

                UA COLOR (test code = COLU) YELLOW          YELLOW           

 

                UA APPEARANCE (test code = APPU) HAZY            CLEAR          

  

 

                UA GLUCOSE DIPSTICK (test code = DGLUU) NEGATIVE mg/dL  NEGATIVE

         

 

                UA BILIRUBIN DIPSTICK (test code = BILU) NEGATIVE mg/dL  NEGATIV

E         

 

                UA KETONE DIPSTICK (test code = KETU) NEGATIVE mg/dL  NEGATIVE  

       

 

                UA SPECIFIC GRAVITY (test code = SGU) 1.027           1.001-1.03

5      

 

                UA BLOOD DIPSTICK (test code = MERT) Negative mg/dL  NEGATIVE    

     

 

                UA PH DIPSTICK (test code = ANAM) 5.0             5.0-8.0        

  

 

                UA PROTEIN DIPSTICK (test code = PROU) 30 (1+) mg/dL   NEGATIVE 

        

 

                UA UROBILINIOGEN DIPSTICK (test code = URO) Normal mg/dL    NEGA

TIVE         

 

                UA NITRITE DIPSTICK (test code = DEMIAN) NEGATIVE        NEGATIVE 

        

 

                UA LEUKOCYTE ESTERASE W REFLEX (test code = LEUUR) NEGATIVE Bryon/

uL NEGATIVE         

 

                UA WBC (test code = WBCU) 11-20 per HPF   0-5              

 

                UA RBC (test code = RBCU) 3-5 #/HPF       0-5              

 

                UA EPITHELIAL CELLS (test code = EPIU) MOD per HPF     FEW      

        

 

                UA BACTERIA (test code = BACU) NONE SEEN #/HPF NONE             

 

                UA HYALINE CAST (test code = HYALU) >20 #/LPF       0-5         

     

 

                UA MUCUS (test code = MUCU) MANY #/LPF      FEW              





Urine Source? Clean CatchLACTIC [id number] 21:15:00* 



                Test Item       Value           Reference Range Comments

 

                LACTIC ACID (test code = LACT) 2.0 mmol/L      0.4-1.9         R

esults called to [id number]  by 

[email or web address] [id number] results verified and read back by Nurse? Y





BASIC METABOLIC [id number] 21:10:00* 



                Test Item       Value           Reference Range Comments

 

                SODIUM (test code = NA) 147 mmol/L      136-145          

 

                POTASSIUM (test code = K) 4.1 mmol/L      3.5-5.1          

 

                CHLORIDE (test code = CL) 113.0 mmol/L               

 

                CARBON DIOXIDE (test code = CO2) 28.0 mmol/L     21-32          

  

 

                ANION GAP (test code = GAP) 10.1            10-20            

 

                GLUCOSE (test code = GLU) 143 mg/dL                  

 

                BLOOD UREA NITROGEN (test code = BUN) 46 mg/dL        7-18      

       

 

                GLOMERULAR FILTRATION RATE (test code = GFR) > 60 mL/min     >=6

0            Estimated GFR by 

using Modified MDRD formula.Chronic kidney disease is defined as either kidney 
damageor GFR <60 mL/min/1.73 m2 for >3 months.

 

                CREATININE (test code = CREAT) 0.80 mg/dL      0.55-1.02       *

*Note change in reference 

range due to change in reagent.**

 

                BUN/CREATININE RATIO (test code = BUN/CREA) 57.5            10-2

0            

 

                CALCIUM (test code = CA) 9.3 mg/dL       8.5-10.1         





HEPATIC FUNCTION [id number] 21:10:00* 



                Test Item       Value           Reference Range Comments

 

                TOTAL PROTEIN (test code = PROT) 7.6 gram/dL     6.4-8.2        

  

 

                ALBUMIN (test code = ALB) 2.9 g/dL        3.4-5.0          

 

                GLOBULIN (test code = GLOB) 4.7 gram/dL     2.7-4.2          

 

                ALBUMIN/GLOBULIN RATIO (test code = A/G) 0.6             0.75-1.

50        

 

                BILIRUBIN TOTAL (test code = BILT) 0.70 mg/dL      0.0-1.0      

    

 

                BILIRUBIN DIRECT (test code = BILD) 0.15 mg/dL      0.0-0.20    

     

 

                SGOT/AST (test code = AST) 35 IUnit/L      15-37            

 

                SGPT/ALT (test code = ALT) 15 IUnit/L      12-78            

 

                ALKALINE PHOSPHATASE TOTAL (test code = ALKP) 81 IUnit/L      45

-117          **Note change in

reference range due to change in reagent.**





[id number] 21:10:00* 



                Test Item       Value           Reference Range Comments

 

                TROPONIN-I (test code = TROPI) <0.015 ng/mL    0-0.045          





- XR CHEST 1 -53-15 21:05:00 FAX:         Luciano Alfaro MD               
   [place]: B   St: REG----------
---------------------------------------------------------------------  Name:   MELANY DOS SANTOS                     [facility]                     : [birth year]  Age/S: 54/F           [address] [facility]                Unit #: [id number]    
 Loc: [place]  [address]              Phys: Luciano Alfaro MD   
                                              Acct: [id number] Dis Date:      
        Status: REG ER                                 PHONE #: [phone]    
Exam Date:     2019                   FAX #: [phone]     
Reason: hypoxic                                            EXAMS:               
                               CPT CODE:      [id number] XR CHEST 1 V            
                  [id number]                    HISTORY: Hypoxia and shortness of 
breath.               COMPARISON: None available.               Patchy nodular l
eft lung infiltrate.  Scattered calcifications along       the left axilla and l
eft chest wall.  No effusion or congestion.        Cardiac silhouette is normal.
                IMPRESSION:                   Patchy nodular left lung infiltra
te without effusion.          ** Electronically Signed by LIVE Thomas on 0
2019 at  **                      Reported and signed by: Khris Thomas M.D.                             CC: Luciano Alfaro MD                         
                                                                                
           Technologist: Charlene Arellano RT(R)                                   
Trnscrd Date/Time/By: 2019 () : By: [email or web address]           Orig Print D
/T: S: 2019 ()                         PAGE  1                       S
igned Report                               BASIC METABOLIC [id number] 
21:00:00* 



                Test Item       Value           Reference Range Comments

 

                SODIUM (test code = NA) 147 mmol/L      136-145          

 

                POTASSIUM (test code = K) 4.1 mmol/L      3.5-5.1          

 

                CHLORIDE (test code = CL) 113.0 mmol/L               

 

                CARBON DIOXIDE (test code = CO2)  mmol/L         21-32          

  

 

                ANION GAP (test code = GAP)                 10-20            

 

                GLUCOSE (test code = GLU)  mg/dL                     

 

                BLOOD UREA NITROGEN (test code = BUN)  mg/dL          7-18      

       

 

                GLOMERULAR FILTRATION RATE (test code = GFR)  mL/min         >=6

0             

 

                CREATININE (test code = CREAT)  mg/dL          0.55-1.02        

 

                BUN/CREATININE RATIO (test code = BUN/CREA)                 10-2

0            

 

                CALCIUM (test code = CA)  mg/dL          8.5-10.1         





HEPATIC FUNCTION [id number] 21:00:00* 



                Test Item       Value           Reference Range Comments

 

                TOTAL PROTEIN (test code = PROT)  gram/dL        6.4-8.2        

  

 

                ALBUMIN (test code = ALB)  g/dL           3.4-5.0          

 

                GLOBULIN (test code = GLOB)  gram/dL        2.7-4.2          

 

                ALBUMIN/GLOBULIN RATIO (test code = A/G)                 0.75-1.

50        

 

                BILIRUBIN TOTAL (test code = BILT)  mg/dL          0.0-1.0      

    

 

                BILIRUBIN DIRECT (test code = BILD)  mg/dL          0.0-0.20    

     

 

                SGOT/AST (test code = AST)  IUnit/L        15-37            

 

                SGPT/ALT (test code = ALT)  IUnit/L        12-78            

 

                ALKALINE PHOSPHATASE TOTAL (test code = ALKP)  IUnit/L        45

-117           





[id number] 21:00:00* 



                Test Item       Value           Reference Range Comments

 

                TROPONIN-I (test code = TROPI)  ng/mL          0-0.045          





CBC W/MANUAL [id number] 20:59:00* 



                Test Item       Value           Reference Range Comments

 

                WHITE BLOOD CELL (test code = WBC) 13.7 K/mm3      4.5-12.5     

    

 

                RED BLOOD CELL (test code = RBC) 4.30 mill/mm3   3.7-5.2        

  

 

                HEMOGLOBIN (test code = HGB) 13.7 gram/dL    11.5-15.5        

 

                HEMATOCRIT (test code = HCT) 42.6 %          36.0-46.0        

 

                MEAN CELL VOLUME (test code = MCV) 99.1 fL         80-98        

    

 

                MEAN CELL HGB (test code = MCH) 31.9 picogram   27.0-33.0       

 

 

                MEAN CELL HGB CONCETRATION (test code = MCHC) 32.2 gram/dL    33

.0-36.0        

 

                RED CELL DISTRIBUTION WIDTH (test code = RDW) 13.7 %          11

.6-16.2        

 

                RED CELL DISTRIBUTION WIDTH SD (test code = RDW-SD) 50.2 fL     

    37.0-51.0        

 

                PLATELET COUNT (test code = PLT) 277 K/mm3       150-450        

  

 

                MEAN PLATELET VOLUME (test code = MPV) 11.3 fL         6.7-11.0 

        

 

                IMMATURE GRANULOCYTE % (test code = IG%) 0.7 %           0.0-5.0

          

 

                NUCLEATED RBC % (test code = NRBC%) 0.0 %           0-0         

     

 

                NEUTROPHIL # (test code = NT#) 11.09 K/mm3     1.8-7.7          

 

                IMMATURE GRANULOCYTE # (test code = IG#) 0.09 x10 3/uL   0-0.03 

          

 

                LYMPHOCYTE # (test code = LY#) 1.53 K/mm3      1.0-5.0          

 

                MONOCYTE # (test code = MO#) 0.96 K/mm3      0-0.8            

 

                EOSINOPHIL # (test code = EO#) 0.00 K/mm3      0.0-0.5          

 

                BASOPHIL # (test code = BA#) 0.05 K/mm3      0.0-0.2          

 

                NUCLEATED RBC # (test code = NRBC#) 0.00 K/mm3      0.0-0.1     

     

 

                MANUAL DIFF REQUIRED (test code = MDIFF) YES                    

          

 

                STAIN ACCEPTABILITY (test code = STN ACCEPTABLE)                

                  

 

                TOTAL CELLS COUNTED (test code = TCC)  #CELLS                   

       

 

                SEGMENTED NEUTROPHILS (test code = SEG)  %              39-69   

         

 

                LYMPHOCYTE (test code = LYMPH)  %              25-55            

 

                MONOCYTE (test code = MON)  %              0-10             

 

                EOSINOPHIL (test code = EOS)  %              0.0-5.0          

 

                CABOT RINGS (test code = CAB)                                  

 

                MORPHOLOGY COMMENT (test code = MOC)                            

      

 

                PLATELET ESTIMATE (test code = PLTEST)                          

        

 

                PLATELET MORPHOLOGY (test code = PLTMORPH)                      

            





CBC W/MANUAL [id number] 20:59:00* 



                Test Item       Value           Reference Range Comments

 

                WHITE BLOOD CELL (test code = WBC) 13.7 K/mm3      4.5-12.5     

    

 

                RED BLOOD CELL (test code = RBC) 4.30 mill/mm3   3.7-5.2        

  

 

                HEMOGLOBIN (test code = HGB) 13.7 gram/dL    11.5-15.5        

 

                HEMATOCRIT (test code = HCT) 42.6 %          36.0-46.0        

 

                MEAN CELL VOLUME (test code = MCV) 99.1 fL         80-98        

    

 

                MEAN CELL HGB (test code = MCH) 31.9 picogram   27.0-33.0       

 

 

                MEAN CELL HGB CONCETRATION (test code = MCHC) 32.2 gram/dL    33

.0-36.0        

 

                RED CELL DISTRIBUTION WIDTH (test code = RDW) 13.7 %          11

.6-16.2        

 

                RED CELL DISTRIBUTION WIDTH SD (test code = RDW-SD) 50.2 fL     

    37.0-51.0        

 

                PLATELET COUNT (test code = PLT) 277 K/mm3       150-450        

  

 

                MEAN PLATELET VOLUME (test code = MPV) 11.3 fL         6.7-11.0 

        

 

                IMMATURE GRANULOCYTE % (test code = IG%) 0.7 %           0.0-5.0

          

 

                NUCLEATED RBC % (test code = NRBC%) 0.0 %           0-0         

     

 

                NEUTROPHIL # (test code = NT#) 11.09 K/mm3     1.8-7.7          

 

                IMMATURE GRANULOCYTE # (test code = IG#) 0.09 x10 3/uL   0-0.03 

          

 

                LYMPHOCYTE # (test code = LY#) 1.53 K/mm3      1.0-5.0          

 

                MONOCYTE # (test code = MO#) 0.96 K/mm3      0-0.8            

 

                EOSINOPHIL # (test code = EO#) 0.00 K/mm3      0.0-0.5          

 

                BASOPHIL # (test code = BA#) 0.05 K/mm3      0.0-0.2          

 

                NUCLEATED RBC # (test code = NRBC#) 0.00 K/mm3      0.0-0.1     

     

 

                MANUAL DIFF REQUIRED (test code = MDIFF) YES                    

          

 

                STAIN ACCEPTABILITY (test code = STN ACCEPTABLE)                

                  

 

                TOTAL CELLS COUNTED (test code = TCC)  #CELLS                   

       

 

                SEGMENTED NEUTROPHILS (test code = SEG)  %              39-69   

         

 

                LYMPHOCYTE (test code = LYMPH)  %              25-55            

 

                MONOCYTE (test code = MON)  %              0-10             

 

                EOSINOPHIL (test code = EOS)  %              0.0-5.0          

 

                CABOT RINGS (test code = CAB)                                  

 

                MORPHOLOGY COMMENT (test code = MOC)                            

      

 

                PLATELET ESTIMATE (test code = PLTEST)                          

        

 

                PLATELET MORPHOLOGY (test code = PLTMORPH)                      

            





CBC W/MANUAL [id number] 20:59:00* 



                Test Item       Value           Reference Range Comments

 

                WHITE BLOOD CELL (test code = WBC) 13.7 K/mm3      4.5-12.5     

    

 

                RED BLOOD CELL (test code = RBC) 4.30 mill/mm3   3.7-5.2        

  

 

                HEMOGLOBIN (test code = HGB) 13.7 gram/dL    11.5-15.5        

 

                HEMATOCRIT (test code = HCT) 42.6 %          36.0-46.0        

 

                MEAN CELL VOLUME (test code = MCV) 99.1 fL         80-98        

    

 

                MEAN CELL HGB (test code = MCH) 31.9 picogram   27.0-33.0       

 

 

                MEAN CELL HGB CONCETRATION (test code = MCHC) 32.2 gram/dL    33

.0-36.0        

 

                RED CELL DISTRIBUTION WIDTH (test code = RDW) 13.7 %          11

.6-16.2        

 

                RED CELL DISTRIBUTION WIDTH SD (test code = RDW-SD) 50.2 fL     

    37.0-51.0        

 

                PLATELET COUNT (test code = PLT) 277 K/mm3       150-450        

  

 

                MEAN PLATELET VOLUME (test code = MPV) 11.3 fL         6.7-11.0 

        

 

                IMMATURE GRANULOCYTE % (test code = IG%) 0.7 %           0.0-5.0

          

 

                NUCLEATED RBC % (test code = NRBC%) 0.0 %           0-0         

     

 

                NEUTROPHIL # (test code = NT#) 11.09 K/mm3     1.8-7.7          

 

                IMMATURE GRANULOCYTE # (test code = IG#) 0.09 x10 3/uL   0-0.03 

          

 

                LYMPHOCYTE # (test code = LY#) 1.53 K/mm3      1.0-5.0          

 

                MONOCYTE # (test code = MO#) 0.96 K/mm3      0-0.8            

 

                EOSINOPHIL # (test code = EO#) 0.00 K/mm3      0.0-0.5          

 

                BASOPHIL # (test code = BA#) 0.05 K/mm3      0.0-0.2          

 

                NUCLEATED RBC # (test code = NRBC#) 0.00 K/mm3      0.0-0.1     

     

 

                MANUAL DIFF REQUIRED (test code = MDIFF) YES                    

          

 

                STAIN ACCEPTABILITY (test code = STN ACCEPTABLE)                

                  

 

                TOTAL CELLS COUNTED (test code = TCC)  #CELLS                   

       

 

                SEGMENTED NEUTROPHILS (test code = SEG)  %              39-69   

         

 

                LYMPHOCYTE (test code = LYMPH)  %              25-55            

 

                MONOCYTE (test code = MON)  %              0-10             

 

                EOSINOPHIL (test code = EOS)  %              0.0-5.0          

 

                MORPHOLOGY COMMENT (test code = MOC)                            

      

 

                PLATELET ESTIMATE (test code = PLTEST)                          

        

 

                PLATELET MORPHOLOGY (test code = PLTMORPH)                      

            





CBC W/MANUAL [id number] 20:59:00* 



                Test Item       Value           Reference Range Comments

 

                WHITE BLOOD CELL (test code = WBC) 13.7 K/mm3      4.5-12.5     

    

 

                RED BLOOD CELL (test code = RBC) 4.30 mill/mm3   3.7-5.2        

  

 

                HEMOGLOBIN (test code = HGB) 13.7 gram/dL    11.5-15.5        

 

                HEMATOCRIT (test code = HCT) 42.6 %          36.0-46.0        

 

                MEAN CELL VOLUME (test code = MCV) 99.1 fL         80-98        

    

 

                MEAN CELL HGB (test code = MCH) 31.9 picogram   27.0-33.0       

 

 

                MEAN CELL HGB CONCETRATION (test code = MCHC) 32.2 gram/dL    33

.0-36.0        

 

                RED CELL DISTRIBUTION WIDTH (test code = RDW) 13.7 %          11

.6-16.2        

 

                RED CELL DISTRIBUTION WIDTH SD (test code = RDW-SD) 50.2 fL     

    37.0-51.0        

 

                PLATELET COUNT (test code = PLT) 277 K/mm3       150-450        

  

 

                MEAN PLATELET VOLUME (test code = MPV) 11.3 fL         6.7-11.0 

        

 

                IMMATURE GRANULOCYTE % (test code = IG%) 0.7 %           0.0-5.0

          

 

                NUCLEATED RBC % (test code = NRBC%) 0.0 %           0-0         

     

 

                NEUTROPHIL # (test code = NT#) 11.09 K/mm3     1.8-7.7          

 

                IMMATURE GRANULOCYTE # (test code = IG#) 0.09 x10 3/uL   0-0.03 

          

 

                LYMPHOCYTE # (test code = LY#) 1.53 K/mm3      1.0-5.0          

 

                MONOCYTE # (test code = MO#) 0.96 K/mm3      0-0.8            

 

                EOSINOPHIL # (test code = EO#) 0.00 K/mm3      0.0-0.5          

 

                BASOPHIL # (test code = BA#) 0.05 K/mm3      0.0-0.2          

 

                NUCLEATED RBC # (test code = NRBC#) 0.00 K/mm3      0.0-0.1     

     

 

                MANUAL DIFF REQUIRED (test code = MDIFF) YES                    

          

 

                STAIN ACCEPTABILITY (test code = STN ACCEPTABLE)                

                  

 

                TOTAL CELLS COUNTED (test code = TCC)  #CELLS                   

       

 

                SEGMENTED NEUTROPHILS (test code = SEG)  %              39-69   

         

 

                LYMPHOCYTE (test code = LYMPH)  %              25-55            

 

                MONOCYTE (test code = MON)  %              0-10             

 

                MORPHOLOGY COMMENT (test code = MOC)                            

      

 

                PLATELET ESTIMATE (test code = PLTEST)                          

        

 

                PLATELET MORPHOLOGY (test code = PLTMORPH)                      

            





CBC W/MANUAL [id number] 20:59:00* 



                Test Item       Value           Reference Range Comments

 

                WHITE BLOOD CELL (test code = WBC) 13.7 K/mm3      4.5-12.5     

    

 

                RED BLOOD CELL (test code = RBC) 4.30 mill/mm3   3.7-5.2        

  

 

                HEMOGLOBIN (test code = HGB) 13.7 gram/dL    11.5-15.5        

 

                HEMATOCRIT (test code = HCT) 42.6 %          36.0-46.0        

 

                MEAN CELL VOLUME (test code = MCV) 99.1 fL         80-98        

    

 

                MEAN CELL HGB (test code = MCH) 31.9 picogram   27.0-33.0       

 

 

                MEAN CELL HGB CONCETRATION (test code = MCHC) 32.2 gram/dL    33

.0-36.0        

 

                RED CELL DISTRIBUTION WIDTH (test code = RDW) 13.7 %          11

.6-16.2        

 

                RED CELL DISTRIBUTION WIDTH SD (test code = RDW-SD) 50.2 fL     

    37.0-51.0        

 

                PLATELET COUNT (test code = PLT) 277 K/mm3       150-450        

  

 

                MEAN PLATELET VOLUME (test code = MPV) 11.3 fL         6.7-11.0 

        

 

                IMMATURE GRANULOCYTE % (test code = IG%) 0.7 %           0.0-5.0

          

 

                NUCLEATED RBC % (test code = NRBC%) 0.0 %           0-0         

     

 

                NEUTROPHIL # (test code = NT#) 11.09 K/mm3     1.8-7.7          

 

                IMMATURE GRANULOCYTE # (test code = IG#) 0.09 x10 3/uL   0-0.03 

          

 

                LYMPHOCYTE # (test code = LY#) 1.53 K/mm3      1.0-5.0          

 

                MONOCYTE # (test code = MO#) 0.96 K/mm3      0-0.8            

 

                EOSINOPHIL # (test code = EO#) 0.00 K/mm3      0.0-0.5          

 

                BASOPHIL # (test code = BA#) 0.05 K/mm3      0.0-0.2          

 

                NUCLEATED RBC # (test code = NRBC#) 0.00 K/mm3      0.0-0.1     

     

 

                MANUAL DIFF REQUIRED (test code = MDIFF) YES                    

          

 

                STAIN ACCEPTABILITY (test code = STN ACCEPTABLE)                

                  

 

                TOTAL CELLS COUNTED (test code = TCC)  #CELLS                   

       

 

                SEGMENTED NEUTROPHILS (test code = SEG)  %              39-69   

         

 

                LYMPHOCYTE (test code = LYMPH)  %              25-55            

 

                MONOCYTE (test code = MON)  %              0-10             

 

                EOSINOPHIL (test code = EOS)  %              0.0-5.0          

 

                CABOT RINGS (test code = CAB)                                  

 

                MORPHOLOGY COMMENT (test code = MOC)                            

      

 

                PLATELET ESTIMATE (test code = PLTEST)                          

        

 

                PLATELET MORPHOLOGY (test code = PLTMORPH)                      

            





CBC W/AUTO [id number] 20:52:00* 



                Test Item       Value           Reference Range Comments

 

                WHITE BLOOD CELL (test code = WBC)  K/mm3          4.5-12.5     

    

 

                RED BLOOD CELL (test code = RBC)  mill/mm3       3.7-5.2        

  

 

                HEMOGLOBIN (test code = HGB) 13.7 gram/dL    11.5-15.5        

 

                HEMATOCRIT (test code = HCT) 42.6 %          36.0-46.0        

 

                MEAN CELL VOLUME (test code = MCV)  fL             80-98        

    

 

                MEAN CELL HGB (test code = MCH)  picogram       27.0-33.0       

 

 

                MEAN CELL HGB CONCETRATION (test code = MCHC)  gram/dL        33

.0-36.0        

 

                RED CELL DISTRIBUTION WIDTH (test code = RDW)  %              11

.6-16.2        

 

                RED CELL DISTRIBUTION WIDTH SD (test code = RDW-SD)  fL         

    37.0-51.0        

 

                PLATELET COUNT (test code = PLT)  K/mm3          150-450        

  

 

                MEAN PLATELET VOLUME (test code = MPV)  fL             6.7-11.0 

        

 

                NEUTROPHIL % (test code = NT%)  %              39.0-69.0        

 

                IMMATURE GRANULOCYTE % (test code = IG%)  %              0.0-5.0

          

 

                LYMPHOCYTE % (test code = LY%)  %              25.0-55.0        

 

                MONOCYTE % (test code = MO%)  %              0.0-10.0         

 

                EOSINOPHIL % (test code = EO%)  %              0.0-5.0          

 

                BASOPHIL % (test code = BA%)  %              0.0-1.0          

 

                NEUTROPHIL # (test code = NT#)  K/mm3          1.8-7.7          

 

                LYMPHOCYTE # (test code = LY#)  K/mm3          1.0-5.0          

 

                MONOCYTE # (test code = MO#)  K/mm3          0-0.8            

 

                EOSINOPHIL # (test code = EO#)  K/mm3          0.0-0.5          

 

                BASOPHIL # (test code = BA#)  K/mm3          0.0-0.2          





POC LACTIC [id number] 20:47:00* 



                Test Item       Value           Reference Range Comments

 

                POC LACTIC ACID (test code = POCLAC) 2.42 MMOL/L     0.4-2.2    

      





Urine WBC2019-03-10 23:37:00* 



                Test Item       Value           Reference Range Comments

 

                Urine WBC (test code = 5821-4) 21-50           0-5              





Urine RBC2019-03-10 23:37:00* 



                Test Item       Value           Reference Range Comments

 

                Urine RBC (test code = 75280-3) 6-10            0-5             

 





Urine Bacteria2019-03-10 23:37:00* 



                Test Item       Value           Reference Range Comments

 

                Urine Bacteria (test code = 37440-9) MANY            NONE       

      





Urine Epithelial Cells2019-03-10 23:37:00* 



                Test Item       Value           Reference Range Comments

 

                Urine Epithelial Cells (test code = 51751-0) FEW             NON

E             





Urine Color2019-03-10 23:30:00* 



                Test Item       Value           Reference Range Comments

 

                Urine Color (test code = 5778-6) YELLOW          YELLOW         

  





Urine [id number] 23:30:00* 



                Test Item       Value           Reference Range Comments

 

                Urine Clarity (test code = 18398-0) HAZY            CLEAR       

     





Urine Specific Gravity2019-03-10 23:30:00* 



                Test Item       Value           Reference Range Comments

 

                Urine Specific Gravity (test code = 5811-5) 1.025           1.01

0-1.025      





Urine pH2019-03-10 23:30:00* 



                Test Item       Value           Reference Range Comments

 

                Urine pH (test code = 52062-2) 6               5-7              





Urine Leukocyte Esterase2019-03-10 23:30:00* 



                Test Item       Value           Reference Range Comments

 

                Urine Leukocyte Esterase (test code = 5799-2) NEGATIVE        NE

GATIVE         





Urine Nitrite2019-03-10 23:30:00* 



                Test Item       Value           Reference Range Comments

 

                Urine Nitrite (test code = 52307-1) POSITIVE        NEGATIVE    

     





Urine Protein2019-03-10 23:30:00* 



                Test Item       Value           Reference Range Comments

 

                Urine Protein (test code = 5804-0) TRACE           NEGATIVE     

    





Urine Glucose (UA)2019-03-10 23:30:00* 



                Test Item       Value           Reference Range Comments

 

                Urine Glucose (UA) (test code = 2349-9) NEGATIVE        NEGATIVE

         





Urine Ketones2019-03-10 23:30:00* 



                Test Item       Value           Reference Range Comments

 

                Urine Ketones (test code = 49676-5) 2+              NEGATIVE    

     





Urine Urobilinogen2019-03-10 23:30:00* 



                Test Item       Value           Reference Range Comments

 

                Urine Urobilinogen (test code = 30062-9) 1               0.2-1  

          





Urine Bilirubin2019-03-10 23:30:00* 



                Test Item       Value           Reference Range Comments

 

                Urine Bilirubin (test code = 1978-6) NEGATIVE        NEGATIVE   

      





Urine Blood2019-03-10 23:30:00* 



                Test Item       Value           Reference Range Comments

 

                Urine Blood (test code = 07981-0) NEGATIVE        NEGATIVE      

   





ABDOMEN-1VIEW (KUB)2019-03-10 22:48:00                                          
                                           [place] [address]    
 Patient Name: MELANY TIRADO                                   MR #: [id number] 
                   : 1965                                   Age/Sex: 
53/F  Acct #: [id number]                              Req #: [id number]  Adm 
Physician:                                                      Ordered by: 
VIVI BENZ NP                            Report #: [id number]        
Location: ER                                      Room/Bed:                     
______________________________________________
_____________________________________________________    Procedure: [id number]
/ABDOMEN-1VIEW (KUB)  Exam Date: 03/10/19                            Exam Time: 
                                              REPORT STATUS: Signed    EXAM:
 ABDOMEN-1VIEW (KUB)   DATE: 3/10/2019 9:11 PM   INDICATION: Altered mental sta
tus. Constipation.   COMPARISON: None      FINDINGS:   Motion artifacts limit ev
aluation.      LINES/TUBES: None      BOWEL PATTERN: No dilated loops of small b
owel. The rectum is distended to   approximately 9.2 cm in diameter with stool. 
    SOFT TISSUES: No abnormal calcifications. No mass effect.      LUNG BASES: 
Limited evaluation.      BONES: No apparent displaced fractures.      IMPRESSION
:   Limited exam secondary to motion.      Large amount of stool in the rectum w
hich is distended to approximately 9.2 cm   in diameter. Consider disimpaction. 
    Signed by: DR. Baron Chao MD on 3/10/2019 10:51 PM        Dictated By: 
BARON CHAO MD  Electronically Signed By: BARON CHAO MD on 03/10/19 2251  T
ranscribed By: ANGELA on 03/10/19 2251       COPY TO:   VIVI BENZ NP    
    White Blood Count2019-03-10 22:47:00* 



                Test Item       Value           Reference Range Comments

 

                White Blood Count (test code = 6690-2) 12.09           4.8-10.8 

        





Red Blood Count2019-03-10 22:47:00* 



                Test Item       Value           Reference Range Comments

 

                Red Blood Count (test code = 789-8) 4.32            3.6-5.1     

     





Hemoglobin2019-03-10 22:47:00* 



                Test Item       Value           Reference Range Comments

 

                Hemoglobin (test code = 28135-3) 14.3            12.0-16.0      

  





Hematocrit2019-03-10 22:47:00* 



                Test Item       Value           Reference Range Comments

 

                Hematocrit (test code = 4544-3) 41.5            34.2-44.1       

 





Mean Corpuscular Volume2019-03-10 22:47:00* 



                Test Item       Value           Reference Range Comments

 

                Mean Corpuscular Volume (test code = 787-2) 96.1            81-9

9            





Mean Corpuscular Hemoglobin2019-03-10 22:47:00* 



                Test Item       Value           Reference Range Comments

 

                Mean Corpuscular Hemoglobin (test code = 785-6) 33.1            

28-32            





Mean Corpuscular Hemoglobin Concent2019-03-10 22:47:00* 



                Test Item       Value           Reference Range Comments

 

                Mean Corpuscular Hemoglobin Concent (test code = 786-4) 34.5    

        31-35            





Red Cell Distribution Width2019-03-10 22:47:00* 



                Test Item       Value           Reference Range Comments

 

                Red Cell Distribution Width (test code = 73772-3) 13.5          

  11.7-14.4        





Platelet Count2019-03-10 22:47:00* 



                Test Item       Value           Reference Range Comments

 

                Platelet Count (test code = 777-3) 194             140-360      

    





Neutrophils (%) (Auto)2019-03-10 22:47:00* 



                Test Item       Value           Reference Range Comments

 

                Neutrophils (%) (Auto) (test code = 15809-9) 67.3            38.

7-80.0        





Lymphocytes (%) (Auto)2019-03-10 22:47:00* 



                Test Item       Value           Reference Range Comments

 

                Lymphocytes (%) (Auto) (test code = 736-9) 24.2            18.0-

39.1        





Monocytes (%) (Auto)2019-03-10 22:47:00* 



                Test Item       Value           Reference Range Comments

 

                Monocytes (%) (Auto) (test code = 5905-5) 5.7             4.4-11

.3         





Eosinophils (%) (Auto)2019-03-10 22:47:00* 



                Test Item       Value           Reference Range Comments

 

                Eosinophils (%) (Auto) (test code = 713-8) 1.9             0.0-6

.0          





Basophils (%) (Auto)2019-03-10 22:47:00* 



                Test Item       Value           Reference Range Comments

 

                Basophils (%) (Auto) (test code = 706-2) 0.7             0.0-1.0

          





IM GRANULOCYTES %2019-03-10 22:47:00* 



                Test Item       Value           Reference Range Comments

 

                IM GRANULOCYTES % (test code = IM GRANULOCYTES %) 0.2           

  0.0-1.0          





Neutrophils # (Auto)2019-03-10 22:47:00* 



                Test Item       Value           Reference Range Comments

 

                Neutrophils # (Auto) (test code = 751-8) 8.1             2.1-6.9

          





Lymphocytes # (Auto)2019-03-10 22:47:00* 



                Test Item       Value           Reference Range Comments

 

                Lymphocytes # (Auto) (test code = 57213-0) 2.9             1.0-3

.2          





Monocytes # (Auto)2019-03-10 22:47:00* 



                Test Item       Value           Reference Range Comments

 

                Monocytes # (Auto) (test code = 742-7) 0.7             0.2-0.8  

        





Eosinophils # (Auto)2019-03-10 22:47:00* 



                Test Item       Value           Reference Range Comments

 

                Eosinophils # (Auto) (test code = 711-2) 0.2             0.0-0.4

          





Basophils # (Auto)2019-03-10 22:47:00* 



                Test Item       Value           Reference Range Comments

 

                Basophils # (Auto) (test code = 704-7) 0.1             0.0-0.1  

        





Absolute Immature Granulocyte (auto2019-03-10 22:47:00* 



                Test Item       Value           Reference Range Comments

 

                                        Absolute Immature Granulocyte (auto (trupti

t code = Absolute Immature Granulocyte 

(auto)              0.03                0-0.1                





Sodium Level2019-03-10 22:42:00* 



                Test Item       Value           Reference Range Comments

 

                Sodium Level (test code = 2951-2) 140             136-145       

   





Potassium Level2019-03-10 22:42:00* 



                Test Item       Value           Reference Range Comments

 

                Potassium Level (test code = 2823-3) 3.5             3.5-5.1    

      





Chloride Level2019-03-10 22:42:00* 



                Test Item       Value           Reference Range Comments

 

                Chloride Level (test code = 2075-0) 106                   

     





Carbon Dioxide Level2019-03-10 22:42:00* 



                Test Item       Value           Reference Range Comments

 

                Carbon Dioxide Level (test code = 2028-9) 23              22-29 

           





Anion Gap2019-03-10 22:42:00* 



                Test Item       Value           Reference Range Comments

 

                Anion Gap (test code = 33037-3) 14.5            8-16            

 





Blood Urea Nitrogen2019-03-10 22:42:00* 



                Test Item       Value           Reference Range Comments

 

                Blood Urea Nitrogen (test code = 3094-0) 24              7-26   

          





Creatinine2019-03-10 22:42:00* 



                Test Item       Value           Reference Range Comments

 

                Creatinine (test code = 2160-0) 0.58            0.57-1.11       

 





BUN/Creatinine Ratio2019-03-10 22:42:00* 



                Test Item       Value           Reference Range Comments

 

                BUN/Creatinine Ratio (test code = 3097-3) 41              6-25  

           





Estimat Glomerular Filtration Rate2019-03-10 22:42:00* 



                Test Item       Value           Reference Range Comments

 

                Estimat Glomerular Filtration Rate (test code = 628000486) > 60 

           >60              





Ranges were taken from the [employer] and the [facility] [facility] literature.Reference ranges:60 or greater: [id number] (
for 3 consecutive months): Chronic kidney disease 15 or less: Kidney failure
Glucose Level2019-03-10 22:42:00* 



                Test Item       Value           Reference Range Comments

 

                Glucose Level (test code = DZV7784) 97                    

     





Calcium Level2019-03-10 22:42:00* 



                Test Item       Value           Reference Range Comments

 

                Calcium Level (test code = 07526-7) 10.0            8.4-10.2    

     





Total Bilirubin2019-03-10 22:42:00* 



                Test Item       Value           Reference Range Comments

 

                Total Bilirubin (test code = 1975-2) 1.1             0.2-1.2    

      





Aspartate Amino Transf (AST/SGOT)2019-03-10 22:42:00* 



                Test Item       Value           Reference Range Comments

 

                                        Aspartate Amino Transf (AST/SGOT) (test 

code = Aspartate Amino Transf 

(AST/SGOT))         32                  5-34                 





Alanine Aminotransferase (ALT/SGPT)2019-03-10 22:42:00* 



                Test Item       Value           Reference Range Comments

 

                Alanine Aminotransferase (ALT/SGPT) (test code = 1742-6) 26     

         0-55             





Total Protein2019-03-10 22:42:00* 



                Test Item       Value           Reference Range Comments

 

                Total Protein (test code = 2885-2) 7.2             6.5-8.1      

    





Albumin2019-03-10 22:42:00* 



                Test Item       Value           Reference Range Comments

 

                Albumin (test code = 1751-7) 3.9             3.5-5.0          





Globulin2019-03-10 22:42:00* 



                Test Item       Value           Reference Range Comments

 

                Globulin (test code = 51203-3) 3.3             2.3-3.5          





Albumin/Globulin Ratio2019-03-10 22:42:00* 



                Test Item       Value           Reference Range Comments

 

                Albumin/Globulin Ratio (test code = 1759-0) 1.2             0.8-

2.0          





Alkaline Phosphatase2019-03-10 22:42:00* 



                Test Item       Value           Reference Range Comments

 

                Alkaline Phosphatase (test code = 6768-6) 61              

           





FL, LUMBAR PUNCTURE, [id number] 07:32:00Reason for Exam:->G04.90FINAL 
REPORT PATIENT ID:   [id number] EXAMINATION: Fluoroscopically-guided lumbar 
puncture  HISTORY: Encephalitis, TECHNIQUE:medication: None.anesthesia: 1% lidoc
dung with an equal volume of sodium bicarbonate 4.2%.  needle: 25 gauge KristenCYBRA
lulu time: 00:22 minDAP: 66.02 microGy-m2 PROCEDURE:After giving informed conse
nt, the patient lay prone on the examination table.  The back was prepped and dr
aped in the usual sterile manner, and then 1% lidocaine was infiltrated in the s
kin.  The spinal needle was advanced through the L4-L5 interspace until clear, c
olorless CSF was obtained.  Approximately 15 mL of fluid was removed and sent to
the laboratory for tests ordered by the referring physician. The patient tolera
michelle the procedure well and was transferred to the radiology recovery area. FINDI
NGS:CSF:  Clear, colorless.  IMPRESSION: Successful fluoroscopically-guided lumb
ar puncture without complications. Signed: Judy Johansen MDReport Verified Date/T
tatyana:  2019 07:32:28 Reading Location: [facility] Reading Room [address] [place] [address] 
       Electronically signed by: JUDY JOHANSEN on 2019 07:32 AM EEG AWAKE 
AND [id number] 13:17:00Reason for exam:->G04.90DATE OF REPORT:  2019 
NAME: Melany Negrete MRN: [id number] YOB: 1965 ACC: 
[id number] Start time: 9:15 Stop time: 9:45 ICD-10: R56.9, G04.90  
CPT Code: 60929   HISTORY: Melany Negrete is a 53 y.o. female with 
history of visual changes, slow cognition and speech, concern for encephalitis, 
previously started on valproic acid   MEDICATIONS THAT COULD AFFECT EEG: 
Valproic acid   TECHNICAL SUMMARY: This is a digital EEG recorded with 32 input 
channels on a [employer] system,  reviewed with bipolar and referential 
montages using the modified combinatorial system nomenclature.    DESCRIPTION OF
RECORD: During the maximally alert state, a 9-10 Hz posterior dominant rhythm w
as seen that was symmetric, reactive to eye opening and well regulated.  More an
teriorly, low voltage frontocentral beta predominated.  Drowsiness was character
ized by alpha attenuation and increased frontocentral theta.  Stage 2 sleep was 
reached characterized by symmetric sleep spindles and K-complexes. With sleep, t
here are right sided benign epileptiform transient of sleep (BETS).    HV: Hyper
ventilation was not performed.   PHOTIC STIMULATION: Photic stimulation was done
from 3-18 Hz; photoparoxysmal responses were absent.     ELECTROCARDIOGRAM: EKG 
tracing reviewed, showing normal sinus rhythm   IMPRESSION: Normal Awake and Sl
eep EEG  CLINICAL CORRELATION: A normal EEG does not rule out epilepsy.  If the 
clinical suspicion of epilepsy is high, consider a repeat EEG.  The BETS are con
sidered a normal variant, not associated with epilepsy.   Milena Jennings MD, PhD E
pilepsy Fellow   Valdo Wood M.D., WALI, EDDIE, ALETHA Professor of Neurolog
y, [facility] [employer] Director, [facility] Comprehensive Epilepsy
Center Head, [facility] at [place], [place]        Electronically signed by: VALDO WOOD on  01:17 PM

## 2020-05-04 NOTE — NUR
Patient here admitted from [facility] to room 197 via ems. Patient transferred to bed. Call light 
within reach and bed in locked and lowest position.

## 2020-05-05 VITALS — DIASTOLIC BLOOD PRESSURE: 62 MMHG | SYSTOLIC BLOOD PRESSURE: 97 MMHG

## 2020-05-05 VITALS — SYSTOLIC BLOOD PRESSURE: 104 MMHG | DIASTOLIC BLOOD PRESSURE: 51 MMHG

## 2020-05-05 VITALS — DIASTOLIC BLOOD PRESSURE: 68 MMHG | SYSTOLIC BLOOD PRESSURE: 118 MMHG

## 2020-05-05 VITALS — SYSTOLIC BLOOD PRESSURE: 99 MMHG | DIASTOLIC BLOOD PRESSURE: 61 MMHG

## 2020-05-05 VITALS — SYSTOLIC BLOOD PRESSURE: 109 MMHG | DIASTOLIC BLOOD PRESSURE: 64 MMHG

## 2020-05-05 VITALS — SYSTOLIC BLOOD PRESSURE: 106 MMHG | DIASTOLIC BLOOD PRESSURE: 68 MMHG

## 2020-05-05 VITALS — DIASTOLIC BLOOD PRESSURE: 58 MMHG | SYSTOLIC BLOOD PRESSURE: 101 MMHG

## 2020-05-05 VITALS — DIASTOLIC BLOOD PRESSURE: 61 MMHG | SYSTOLIC BLOOD PRESSURE: 99 MMHG

## 2020-05-05 VITALS — SYSTOLIC BLOOD PRESSURE: 118 MMHG | DIASTOLIC BLOOD PRESSURE: 68 MMHG

## 2020-05-05 LAB
ANION GAP SERPL CALC-SCNC: 7.6 MMOL/L (ref 8–16)
BASOPHILS # BLD AUTO: 0 10*3/UL (ref 0–0.1)
BASOPHILS NFR BLD AUTO: 0.5 % (ref 0–1)
BUN SERPL-MCNC: 9 MG/DL (ref 7–26)
BUN/CREAT SERPL: 22 (ref 6–25)
CALCIUM SERPL-MCNC: 8.1 MG/DL (ref 8.4–10.2)
CHLORIDE SERPL-SCNC: 109 MMOL/L (ref 98–107)
CO2 SERPL-SCNC: 26 MMOL/L (ref 22–29)
DEPRECATED NEUTROPHILS # BLD AUTO: 5.8 10*3/UL (ref 2.1–6.9)
EGFRCR SERPLBLD CKD-EPI 2021: > 60 ML/MIN (ref 60–?)
EOSINOPHIL # BLD AUTO: 0.2 10*3/UL (ref 0–0.4)
EOSINOPHIL NFR BLD AUTO: 2.5 % (ref 0–6)
ERYTHROCYTE [DISTWIDTH] IN CORD BLOOD: 14.6 % (ref 11.7–14.4)
GLUCOSE SERPLBLD-MCNC: 82 MG/DL (ref 74–118)
HCT VFR BLD AUTO: 24.4 % (ref 34.2–44.1)
HGB BLD-MCNC: 7.3 G/DL (ref 12–16)
LYMPHOCYTES # BLD: 1.5 10*3/UL (ref 1–3.2)
LYMPHOCYTES NFR BLD AUTO: 19 % (ref 18–39.1)
MCH RBC QN AUTO: 28.1 PG (ref 28–32)
MCHC RBC AUTO-ENTMCNC: 29.9 G/DL (ref 31–35)
MCV RBC AUTO: 93.8 FL (ref 81–99)
MONOCYTES # BLD AUTO: 0.5 10*3/UL (ref 0.2–0.8)
MONOCYTES NFR BLD AUTO: 6.2 % (ref 4.4–11.3)
NEUTS SEG NFR BLD AUTO: 71.4 % (ref 38.7–80)
PLATELET # BLD AUTO: 261 X10E3/UL (ref 140–360)
POTASSIUM SERPL-SCNC: 3.6 MMOL/L (ref 3.5–5.1)
RBC # BLD AUTO: 2.6 X10E6/UL (ref 3.6–5.1)
SODIUM SERPL-SCNC: 139 MMOL/L (ref 136–145)

## 2020-05-05 RX ADMIN — TAZOBACTAM SODIUM AND PIPERACILLIN SODIUM SCH MLS/HR: 375; 3 INJECTION, SOLUTION INTRAVENOUS at 06:03

## 2020-05-05 RX ADMIN — FAMOTIDINE SCH MG: 10 INJECTION, SOLUTION INTRAVENOUS at 08:45

## 2020-05-05 RX ADMIN — TAZOBACTAM SODIUM AND PIPERACILLIN SODIUM SCH MLS/HR: 375; 3 INJECTION, SOLUTION INTRAVENOUS at 00:40

## 2020-05-05 RX ADMIN — Medication SCH ML: at 12:55

## 2020-05-05 RX ADMIN — FAMOTIDINE SCH MG: 10 INJECTION, SOLUTION INTRAVENOUS at 17:00

## 2020-05-05 RX ADMIN — TAZOBACTAM SODIUM AND PIPERACILLIN SODIUM SCH MLS/HR: 375; 3 INJECTION, SOLUTION INTRAVENOUS at 18:03

## 2020-05-05 RX ADMIN — Medication SCH ML: at 20:00

## 2020-05-05 RX ADMIN — SODIUM CHLORIDE SCH MLS/HR: 9 INJECTION, SOLUTION INTRAVENOUS at 06:03

## 2020-05-05 RX ADMIN — Medication SCH ML: at 01:00

## 2020-05-05 RX ADMIN — Medication SCH ML: at 07:15

## 2020-05-05 RX ADMIN — TAZOBACTAM SODIUM AND PIPERACILLIN SODIUM SCH MLS/HR: 375; 3 INJECTION, SOLUTION INTRAVENOUS at 11:21

## 2020-05-05 RX ADMIN — VANCOMYCIN HYDROCHLORIDE SCH MLS/HR: 1 INJECTION, SOLUTION INTRAVENOUS at 15:00

## 2020-05-05 RX ADMIN — SODIUM CHLORIDE SCH MLS/HR: 9 INJECTION, SOLUTION INTRAVENOUS at 12:00

## 2020-05-05 NOTE — NUR
Nutrition Intervention Note



RD Recommendation(s) for Physician: 

- When able to resume TF, recommend Jevity 1.2 with goal rate of 55 ml/hr (provides 1584 
kcal, 73 gm protein, 1065 ml water). Water flushes and fluid management per MD. 

- Naren 1 packet BID to promote wound healing

- Vitamin C 500 mg BID to promote wound healing

- Zinc Sulfate 220 mg once a day to promote wound healing



Plan of Care: RD following, monitoring for tolerance and adequacy. TF, supplement, and 
Vit/min rec's. 



Nutrition reason for involvement:

MD Consult, Nutrition Risk Trigger



RD Assessment

5/5: 55 YOF admitted for decubitus ulcer, sepsis, and UTI. Pt seen today per MD consult and 
MST screen. Pt with hx of advanced dementia and possible autoimmune encephalopathy as well 
as dysphagia and PEG on admit. Pt discussed during MDR. Per RN pt on Glucerna 1.2 at home, 
unable to obtain goal rate at this time and per RN son stated pt sometimes given Ensure via 
PEG. Pt with multiple pressure ulcers on admit, wound care following. Rec's discussed with 
RN on unit and placed in chart for consulting MD. Will continue to monitor. 



Principal Problems/Diagnoses: decubitus ulcer, sepsis, and UTI



PMH: advanced dementia, possible autoimmune encephalopathy, dysphagia, PEG



GI: WDL, + PEG



Skin: R hip unstagable, multiple PU to buttocks- stage II-III



Labs: 5/5: Na 139, K 3.6, BUN 9, Cr 0.41, Gluc 82



Meds: abx, pepcid, zofran



IVF: NS at 100 ml/hr 



Ht: 60 in

Wt: 100.4 lb

BMI: 19.6 kg/m2

IBW: 100 lb



Malnutrition Evaluation (5/5/20)

The patient does not meet criteria for a specified degree of malnutrition at this time. Will 
re-evaluate at follow-up as appropriate. 

Unable to complete assessment.  



Energy intake: NEVILLE

Weight loss: NEVILLE

Fat loss: Mild, very little skinfold thickness to triceps

Muscle loss: Mild, clavicle visible 

Supporting Evidence:

Fluid accumulation: none

Functional Status: unable to evaluate





Nutrition Prescription (Diet Order): NPO



Estimated Nutritional Needs:

5937-9294 calories/day (30-35 kcal/kg CBW) 100.4 lb

68-91 g protein/day (1.5-2 g pro/kg CBW)





Diet Adequacy:

 Not meeting calorie needs, Not meeting protein needs

Diet Tolerance: pending



Diet Education Needs Assessment:

Diet education not indicated, patient on temporary/transition diet.





Nutrition Care Level: mod



Nutrition Diagnosis: Inadequate energy and protein intake related to current medical 
conditions and PEG on admit as evidenced by NPO.



Goal: Patient will meet % of estimated needs by follow up 

Progress: N/A



Interventions:

-Composition, Rate, Route, IVF, Prescription medications, Liquid supplement, Recommended 
Modifications,  Multivitamin/mineral supplement therapy, Collaboration with other providers



Monitoring/Evaluation:

-Total energy intake, Total protein intake, Formula/Solution, Liquid supplement





Signed: Ena Bowles RD, LD, CNSC

## 2020-05-05 NOTE — CONSULTATION
DATE OF CONSULTATION:    

 

REASON FOR CONSULTATION:  Decubitus ulcer, concern osteomyelitis.

 

HISTORY OF PRESENT ILLNESS:  This is a 55-year-old  female, who has

history of encephalopathy, dementia, dysphagia status post PEG tube placement,

bedridden, and hypertension.  There are no family at the bedside.  According to the

record, the patient comes here with the right side wound, which she had for sometime.

The patient does not really provide any information. 

 

PAST MEDICAL HISTORY:  Encephalopathy, dementia, dysphagia, hypertension, and bedridden.

 

PAST SURGICAL HISTORY:  PEG tube placement.

 

ALLERGIES:  NKA.

 

SOCIAL HISTORY:  The patient apparently no smoking, drug abuse, or alcohol abuse.

 

FAMILY HISTORY:  Could not be obtained.

 

REVIEW OF SYSTEMS:

Could not be obtained.

 

LABORATORY DATA:  Reviewed.  Her urine showed gram-negative rods.  Her wound is showing

gram-positive cocci.  The blood culture is pending.  Apparently, when she first came

here, Garcia catheter was changed and there was pus coming from the urine.  Also, there

was pus coming from the decubitus ulcer.  White count 8.1 and hemoglobin 7.3.  Her

sodium 139, potassium 3.6 with a creatinine of 0.43. 

 

MEDICATIONS:  She is currently on Zosyn and she received a dose of vancomycin.

 

PHYSICAL EXAMINATION:

GENERAL:  She is noncommunicative. 

VITAL SIGNS:  Stable, currently afebrile. 

HEENT:  She is not icteric.  Normocephalic. 

NECK:  Supple. 

CHEST:  Few rhonchi in the bases. 

HEART:  S1 and S2.  No S3, S4, or murmur. 

ABDOMEN:  Soft.  Bowel sounds present.  No tenderness. 

EXTREMITIES:  She had a decubitus ulcer on the right hip, which is deep with yellowish

eschar noted about 7 cm. 

IMPRESSION:  

1. Decubitus ulcer on the right hip, concern osteomyelitis and is going for debridement.

 We will add vancomycin and continue with Zosyn. 

2. Urinary tract infection, pyelonephritis.  Continue Zosyn.  Await urine cultures.

3. Dementia.

4. Encephalopathy.

5. Multiple ulcers noted in sacral area.

6. Bedridden.

7. Dysphagia.

8. Hypertension.

9. Anemia of chronic disease. 

Discussed with the medical team.  We will follow.

 

 

 

 

______________________________

MD MADAY Connell/LANDON

D:  05/05/2020 14:49:22

T:  05/05/2020 15:29:49

Job #:  [id number]

## 2020-05-05 NOTE — NUR
Bedside report and walking rounds completed with on coming nurse. Patient in bed with call 
light within reach. No concerns or issues noted.

## 2020-05-05 NOTE — HISTORY AND PHYSICAL
HISTORY OF PRESENT ILLNESS:  Ms. Ramirez is a 55-year-old female.  History is obtained

from prior records and with the family, informed the nurse.  The patient is severely

demented.  She is bedridden.  She is nonverbal.  She has a PEG tube and apparently, she

was brought to the emergency room because of right hip decubiti, was bleeding.  She also

was found to have several other decubitus with sacral decubitus stage III and that is

the reason for her admission. 

 

ALLERGIES:  NO KNOWN ALLERGIES.

 

PAST MEDICAL HISTORY:  Possible autoimmune encephalopathy, dementia, dysphagia on PEG

feedings, bedridden, hypertension. 

 

SOCIAL HISTORY:  Apparently, she lives at home.  She never smoke and she never drink.

 

PHYSICAL EXAMINATION:

GENERAL:  Today, the patient is with her eyes open.  She is some verbal. 

VITAL SIGNS:  Temperature is 97.5, blood pressure 99/61. 

HEART:  Regular rate. 

LUNGS:  Poor inspiratory effort. 

ABDOMEN:  Soft.  She has a PEG tube. 

EXTREMITIES:  She has her upper and lower extremities contracted.  She has a right

unstageable hip decubiti.  She has a stage III sacral decubiti.  She has some decubiti

in her heels. 

LABORATORY WORK:  White count is 8.11, hemoglobin 7.3, hematocrit is 24.4.  Potassium

3.6, creatinine is 0.41.  Glucose is 82.  COVID is pending.  Blood cultures, wound

cultures and urine cultures are all pending. 

 

ASSESSMENT:  

1. Sepsis probably secondary to decubiti infection.

2. Right hip abscess and infected decubiti.

3. Multiple other decubiti including stage III sacral decubiti.

4. Urinary tract infection.

5. Dementia.

6. Bedridden.

7. Dysphagia, on percutaneous endoscopic gastrostomy feedings.

8. History of hypertension.

 

PLAN:  At present time is to continue O2.  Monitor vital signs.  ID consult with Dr. Godwin.  Continue IV antibiotics and wound care.  Nutritionist consult for

recommendation on feedings.  We are going to follow culture results and adjust

antibiotics as needed.  Overall, prognosis of the patient is guarded due to her severe

dementia, multiple decubiti, and risk of progressing sepsis.  All this was discussed

with nurse.  We will restart any home medications when available. 

 

 

 

 

______________________________

MD HAMLET Dunlap/LANDON

D:  05/05/2020 08:31:04

T:  05/05/2020 09:08:58

Job #:  [id number]

## 2020-05-05 NOTE — NUR
WOUND CARE CONSULT FOR 55 YOFEMALE   HX OF DECUBITUS, SEPSIS,UTI



MANSOOR 9 ON STRICT PUP STATUS AND INTERVENTIONS AND ALTERNATING PRESSURE            MATTRESS 
                



LABS: WBC-8.11

HGB_7.3

GLUCOSE-82



PENDING WOUND CULTURE RIGHT HIP



SKIN ASSESSMENT COMPLETE PATIENT PRESENTS WITH SACRAL STAGE 2 5CM X7CM X 0.1CM

RIGHT HIP STAGE 3 1CM X1CM X 3CM    UNDERMINING 12 O'CLOCK -11 O'CLOCK  8CM





RECOMMENDATIONS



SURGICAL CONSULT FOR RIGHT HIP STAGE 3 ULCERATION TO REMOVE UNDERMINING AND POSSIBLE 
NEGATIVE PRESSURE THERAPY POST SURGICAL INTERVENTION

NURSING TO CONTINUE TO MAINTAIN STRICT PUP STATUS AND INTERVENTIONS AND ALTRNATING PRESSURE 
MATTRESS

NURSING TO CONTINUE TO ASSIST PATIENT AS NEEDED WITH MEALS AND NUTRITIONAL SUPPLEMENTS TO 
ENSURE PROPER REQUIREMENTS FOR HEALING 

NURSING TO CONTINUE TO OFFLOAD FEET AND HEELS AS NEEDED WITH PILLOW SUSPENSION WHEN IN BED 

NURSING TO CLEAN SACRAL STAGE 2  WITH NORMAL SALINE DAILY AND APPLY VENELEX OINTMENT AND 
ALLEVYN FOAM DRESSING

NURSING TO CLEAN RIGHT HIP STAGE 3  WITH NORMAL SALINE DAILY AND APPLY 4X4 AND ABD PAD 
SECURE WITH FOAM TAPE


-------------------------------------------------------------------------------

Addendum: 05/05/20 at 1340 by Berlin Blanco RN

-------------------------------------------------------------------------------

Amended: Links added.

## 2020-05-05 NOTE — NUR
bedside rounds complete no distress noted, pt nonverbal, moaning noted when touched, L ej18 
g no ss of infiltration noted, dsg to sacrum, and right hip c/d/i, heel protectors in place, 
sanchez to bsd with yellow urine noted, call light in reach, air mattress in place, will 
continue to monitor

## 2020-05-06 VITALS — SYSTOLIC BLOOD PRESSURE: 104 MMHG | DIASTOLIC BLOOD PRESSURE: 51 MMHG

## 2020-05-06 VITALS — DIASTOLIC BLOOD PRESSURE: 63 MMHG | SYSTOLIC BLOOD PRESSURE: 110 MMHG

## 2020-05-06 VITALS — SYSTOLIC BLOOD PRESSURE: 100 MMHG | DIASTOLIC BLOOD PRESSURE: 49 MMHG

## 2020-05-06 VITALS — DIASTOLIC BLOOD PRESSURE: 45 MMHG | SYSTOLIC BLOOD PRESSURE: 104 MMHG

## 2020-05-06 VITALS — SYSTOLIC BLOOD PRESSURE: 95 MMHG | DIASTOLIC BLOOD PRESSURE: 42 MMHG

## 2020-05-06 PROCEDURE — 0J990ZZ DRAINAGE OF BUTTOCK SUBCUTANEOUS TISSUE AND FASCIA, OPEN APPROACH: ICD-10-PCS | Performed by: SURGERY

## 2020-05-06 RX ADMIN — TAZOBACTAM SODIUM AND PIPERACILLIN SODIUM SCH MLS/HR: 375; 3 INJECTION, SOLUTION INTRAVENOUS at 06:39

## 2020-05-06 RX ADMIN — TAZOBACTAM SODIUM AND PIPERACILLIN SODIUM SCH MLS/HR: 375; 3 INJECTION, SOLUTION INTRAVENOUS at 00:35

## 2020-05-06 RX ADMIN — Medication SCH ML: at 12:50

## 2020-05-06 RX ADMIN — SODIUM CHLORIDE SCH MLS/HR: 9 INJECTION, SOLUTION INTRAVENOUS at 12:08

## 2020-05-06 RX ADMIN — Medication SCH ML: at 01:30

## 2020-05-06 RX ADMIN — CASTOR OIL AND BALSAM, PERU SCH GM: 788; 87 OINTMENT TOPICAL at 12:08

## 2020-05-06 RX ADMIN — FAMOTIDINE SCH MG: 10 INJECTION, SOLUTION INTRAVENOUS at 16:39

## 2020-05-06 RX ADMIN — TAZOBACTAM SODIUM AND PIPERACILLIN SODIUM SCH MLS/HR: 375; 3 INJECTION, SOLUTION INTRAVENOUS at 12:20

## 2020-05-06 RX ADMIN — SODIUM CHLORIDE SCH MLS/HR: 9 INJECTION, SOLUTION INTRAVENOUS at 06:39

## 2020-05-06 RX ADMIN — FAMOTIDINE SCH MG: 10 INJECTION, SOLUTION INTRAVENOUS at 08:20

## 2020-05-06 RX ADMIN — Medication SCH ML: at 19:25

## 2020-05-06 RX ADMIN — VANCOMYCIN HYDROCHLORIDE SCH MLS/HR: 1 INJECTION, SOLUTION INTRAVENOUS at 16:39

## 2020-05-06 RX ADMIN — Medication SCH ML: at 07:15

## 2020-05-06 RX ADMIN — TAZOBACTAM SODIUM AND PIPERACILLIN SODIUM SCH MLS/HR: 375; 3 INJECTION, SOLUTION INTRAVENOUS at 18:40

## 2020-05-06 NOTE — PROGRESS NOTE
DATE:  05/06/2020  

 

SUBJECTIVE:  Ms. Ramirez is a 55-year-old female with history of severe dementia,

bedridden, dysphagia, on PEG feedings, right hip decubiti, who came to the emergency

room because of bleeding of the decubiti that is unstageable.  She is being seen by Dr. Michael Willoughby because she is going to have her PEG tube exchange.  She has also been

seen by Infectious Disease and started on IV antibiotics and wound care.  She is going

to go for incision and drainage of the right hip decubiti today. 

 

PHYSICAL EXAMINATION:

GENERAL:  She is nonverbal.  Her eyes are open.  She moves her head. 

VITAL SIGNS:  Temperature is 98.3, blood pressure 104/45. 

HEART:  Regular rate. 

LUNGS:  Poor inspiratory effort. 

ABDOMEN:  Soft.  She has a PEG tube.

LABORATORY DATA:  On the blood work; white count is 8.11, hemoglobin 7.3, hematocrit

24.4.  Potassium 3.6, creatinine is 0.41.  Coronavirus came back negative.  Urine

culture is growing ESBL E coli.  Blood culture so far negative.  Wound cultures are

pending. 

 

ASSESSMENT:  

1. Sepsis secondary to decubiti infection.

2. Right hip decubiti unstageable with abscess.

3. Multiple other decubiti with stage IV sacral decubiti.

4. Urinary tract infection with extended spectrum beta-lactamases Escherichia coli.

5. Severe dementia.

6. Bedridden.

7. Dysphagia, on percutaneous endoscopic gastrostomy feedings.

8. History of hypertension.

 

PLAN:  At present time is to continue to monitor electrolytes.  Continue IV antibiotics

and wound care.  She is going to go for a debridement of the right hip wound today.  She

is going to have her PEG tube changed.  The overall prognosis of the patient is still

guarded. 

 

I spent more than 35 minutes examining patient, reviewing overnight event, lab results,

and I discussed plan of care with nurse. 

 

 

 

 

______________________________

MD HAMLET Dunlap/LANDON

D:  05/06/2020 08:42:03

T:  05/06/2020 10:14:51

Job #:  [id number]

## 2020-05-06 NOTE — NUR
Bedside report and walking rounds completed with on coming nurse. Patient in bed with call 
light within reach. No issues or concerns noted.

## 2020-05-06 NOTE — OPERATIVE REPORT
DATE OF PROCEDURE:  05/06/2020

 

SURGEON:  Phuc Morales MD

 

PREOPERATIVE DIAGNOSIS:  Infected ulcer, right hip.

 

POSTOPERATIVE DIAGNOSIS:  Infected ulcer, right hip with necrotic tissue.

 

PROCEDURES:  Incision and drainage of right hip abscess; excisional debridement of right

hip ulcer, skin, subcutaneous tissue and muscle, 100 square cm. 

 

ASSISTANT:  None.

 

ANESTHESIA:  General.

 

INDICATIONS AND FINDINGS:  The patient is a 55-year-old female admitted with pain and

with drainage from the right hip area with signs of sepsis.  At Surgery, the patient was

found to have a purulent fluid in the right hip.  There was some erosion into the bone,

as the abscess extended down to bone.  There was necrotic muscle, which was excised.

All tissues excised back to healthy bleeding tissues.  Area debridement was about 10 x

10 cm. 

 

TECHNIQUE:  After adequate general anesthesia, the patient in left side down position,

the right hip was prepped and draped in a sterile fashion with Betadine solution.  An

elliptical incision made encompassing the draining sinus, this was excised at her

abscess cavity.  There was some fluid drain, which was bloody purulent fluid, sample

taken for culture and sensitivity.  The abscess cavity extended down to bone, which

revealed some erosion of the bone.  There was necrotic muscle, which was excised, also

additional underlying skin was excised.  Total area of excision was about 10 x 10 cm.

Hemostasis achieved with electrocautery.  The wound was irrigated with dilute Betadine

solution, inspected for hemostasis, which was seen to be adequate.  The wound was then

packed open with dilute Betadine solution, moistened gauze, and sterile dressing

applied.  The patient tolerated the procedure well.  Estimated blood loss was 75 mL.

There were no complications.  All counts were correct.  The patient was taken to the

recovery room in satisfactory condition. 

 

 

 

 

______________________________

Phuc Morales MD

 

DWG/MODL

D:  05/06/2020 15:20:08

T:  05/06/2020 22:54:19

Job #:  [id number]

 

cc:            MD Jackelin Dunlap MD

## 2020-05-06 NOTE — PROGRESS NOTE
DATE:    

 

SUBJECTIVE:  The patient is seen and evaluated.  Discussed with Dr. Godwin Infectious

Disease in details.  Available labs and notes reviewed.  Discussed with the nurse. 

 

REVIEW OF SYSTEMS:

Unable to obtain review of system secondary to the patient's medical condition.

 

PHYSICAL EXAMINATION:

VITAL SIGNS:  Temperature 98.3, pulse 90, respirations 22, blood pressure 104/46. 

Awake, not responding, comfortable in bed, contracted extremities.  The patient is seen

with the nurse. 

CV:  S1-S2 chest equal expansion, clear to auscultation.  No acute distress. 

ABDOMEN:  Soft and nontender.  No distention. 

HEENT:  Moist.  No pallor.  No JVD. 

EXTREMITIES:  Right hip opened with some fresh blood on the dressing.

MEDICATIONS:  Medication list reviewed as far as Infectious Disease point of view, the

patient is on Zosyn and vancomycin IV. 

 

LABORATORY STUDIES:  No new CBC or BMP available.  Last white blood cells 8.11,

hemoglobin 7.3, platelets 261.  Sodium 139, potassium 3.6, creatinine 0.41, above labs

are from 05/01/2020. 

 

MICROBIOLOGY:  Urine culture showed ESBL E coli.  Wound culture growing gram-positive

cocci with identification and sensitivity pending.  Blood culture positive for

gram-positive cocci.  Anaerobic bottle and one set of blood culture was negative, both

of the blood cultures are from 05/04/2020, so as the wound culture and urine culture. 

 

RADIOLOGY STUDIES:  No radiology studies available.

 

ASSESSMENT AND PLAN:  

1. Right hip open wound.

2. Concern osteomyelitis.

3. Abnormal blood culture.

4. Urinary tract infection with MDR/pyelonephritis.

5. Dementia.

6. Multiple wounds.

7. Bedbound.

8. Dysphagia.

9. Hypertension.

10. Anemia of chronic disease.

11. Continue with vancomycin IV and Zosyn and follow with the culture results. 

Urine is sensitive to Zosyn, which is ESBL E coli.  Await surgical debridement of the

hip.  Further management of this patient based on daily finding on laboratory and

physical 

examination thank you very much for this dictation.  Please refer to chart for more

information.  Discussed with Dr. Godwin. 

 

Dictated by Jaden Prather PA-C (Al)

 

______________________________

Jackelin Godwin MD

 

AS/MODL

D:  05/06/2020 09:41:29

T:  05/06/2020 11:03:01

Job #:  [id number]

## 2020-05-07 VITALS — DIASTOLIC BLOOD PRESSURE: 50 MMHG | SYSTOLIC BLOOD PRESSURE: 103 MMHG

## 2020-05-07 VITALS — DIASTOLIC BLOOD PRESSURE: 59 MMHG | SYSTOLIC BLOOD PRESSURE: 103 MMHG

## 2020-05-07 VITALS — SYSTOLIC BLOOD PRESSURE: 103 MMHG | DIASTOLIC BLOOD PRESSURE: 50 MMHG

## 2020-05-07 VITALS — SYSTOLIC BLOOD PRESSURE: 111 MMHG | DIASTOLIC BLOOD PRESSURE: 85 MMHG

## 2020-05-07 VITALS — SYSTOLIC BLOOD PRESSURE: 94 MMHG | DIASTOLIC BLOOD PRESSURE: 55 MMHG

## 2020-05-07 VITALS — SYSTOLIC BLOOD PRESSURE: 99 MMHG | DIASTOLIC BLOOD PRESSURE: 67 MMHG

## 2020-05-07 VITALS — SYSTOLIC BLOOD PRESSURE: 103 MMHG | DIASTOLIC BLOOD PRESSURE: 59 MMHG

## 2020-05-07 VITALS — DIASTOLIC BLOOD PRESSURE: 76 MMHG | SYSTOLIC BLOOD PRESSURE: 108 MMHG

## 2020-05-07 LAB
ALBUMIN SERPL-MCNC: 2.1 G/DL (ref 3.5–5)
ALBUMIN/GLOB SERPL: 0.5 {RATIO} (ref 0.8–2)
ALP SERPL-CCNC: 144 IU/L (ref 40–150)
ALT SERPL-CCNC: < 6 IU/L (ref 0–55)
ANION GAP SERPL CALC-SCNC: 9.8 MMOL/L (ref 8–16)
BASOPHILS # BLD AUTO: 0 10*3/UL (ref 0–0.1)
BASOPHILS NFR BLD AUTO: 0.5 % (ref 0–1)
BUN SERPL-MCNC: 5 MG/DL (ref 7–26)
BUN/CREAT SERPL: 13 (ref 6–25)
CALCIUM SERPL-MCNC: 8.1 MG/DL (ref 8.4–10.2)
CHLORIDE SERPL-SCNC: 112 MMOL/L (ref 98–107)
CO2 SERPL-SCNC: 22 MMOL/L (ref 22–29)
DEPRECATED NEUTROPHILS # BLD AUTO: 5.8 10*3/UL (ref 2.1–6.9)
EGFRCR SERPLBLD CKD-EPI 2021: > 60 ML/MIN (ref 60–?)
EOSINOPHIL # BLD AUTO: 0.2 10*3/UL (ref 0–0.4)
EOSINOPHIL NFR BLD AUTO: 2.2 % (ref 0–6)
ERYTHROCYTE [DISTWIDTH] IN CORD BLOOD: 14.7 % (ref 11.7–14.4)
GLOBULIN PLAS-MCNC: 4.6 G/DL (ref 2.3–3.5)
GLUCOSE SERPLBLD-MCNC: 79 MG/DL (ref 74–118)
HCT VFR BLD AUTO: 24.5 % (ref 34.2–44.1)
HGB BLD-MCNC: 7.4 G/DL (ref 12–16)
IRON SATN MFR SERPL: 10 % (ref 15–50)
IRON SERPL-MCNC: 16 UG/DL (ref 50–170)
LYMPHOCYTES # BLD: 1.5 10*3/UL (ref 1–3.2)
LYMPHOCYTES NFR BLD AUTO: 17.9 % (ref 18–39.1)
MCH RBC QN AUTO: 28 PG (ref 28–32)
MCHC RBC AUTO-ENTMCNC: 30.2 G/DL (ref 31–35)
MCV RBC AUTO: 92.8 FL (ref 81–99)
MONOCYTES # BLD AUTO: 0.6 10*3/UL (ref 0.2–0.8)
MONOCYTES NFR BLD AUTO: 7.4 % (ref 4.4–11.3)
NEUTS SEG NFR BLD AUTO: 71.8 % (ref 38.7–80)
PLATELET # BLD AUTO: 233 X10E3/UL (ref 140–360)
POTASSIUM SERPL-SCNC: 2.8 MMOL/L (ref 3.5–5.1)
RBC # BLD AUTO: 2.64 X10E6/UL (ref 3.6–5.1)
SODIUM SERPL-SCNC: 141 MMOL/L (ref 136–145)
TIBC SERPL-MCNC: 158 UG/DL (ref 261–478)
TRANSFERRIN SERPL-MCNC: 113 MG/DL (ref 180–382)

## 2020-05-07 PROCEDURE — 0DH63UZ INSERTION OF FEEDING DEVICE INTO STOMACH, PERCUTANEOUS APPROACH: ICD-10-PCS | Performed by: INTERNAL MEDICINE

## 2020-05-07 RX ADMIN — FAMOTIDINE SCH MG: 10 INJECTION, SOLUTION INTRAVENOUS at 16:09

## 2020-05-07 RX ADMIN — Medication SCH ML: at 20:25

## 2020-05-07 RX ADMIN — Medication PRN MG: at 22:48

## 2020-05-07 RX ADMIN — CASTOR OIL AND BALSAM, PERU SCH GM: 788; 87 OINTMENT TOPICAL at 08:18

## 2020-05-07 RX ADMIN — TAZOBACTAM SODIUM AND PIPERACILLIN SODIUM SCH MLS/HR: 375; 3 INJECTION, SOLUTION INTRAVENOUS at 17:00

## 2020-05-07 RX ADMIN — FAMOTIDINE SCH MG: 10 INJECTION, SOLUTION INTRAVENOUS at 08:18

## 2020-05-07 RX ADMIN — Medication SCH ML: at 08:00

## 2020-05-07 RX ADMIN — TAZOBACTAM SODIUM AND PIPERACILLIN SODIUM SCH MLS/HR: 375; 3 INJECTION, SOLUTION INTRAVENOUS at 23:53

## 2020-05-07 RX ADMIN — SODIUM CHLORIDE SCH MLS/HR: 9 INJECTION, SOLUTION INTRAVENOUS at 00:08

## 2020-05-07 RX ADMIN — VANCOMYCIN HYDROCHLORIDE SCH MLS/HR: 1 INJECTION, SOLUTION INTRAVENOUS at 15:36

## 2020-05-07 RX ADMIN — TAZOBACTAM SODIUM AND PIPERACILLIN SODIUM SCH MLS/HR: 375; 3 INJECTION, SOLUTION INTRAVENOUS at 12:00

## 2020-05-07 RX ADMIN — SODIUM CHLORIDE SCH MLS/HR: 9 INJECTION, SOLUTION INTRAVENOUS at 16:09

## 2020-05-07 RX ADMIN — TAZOBACTAM SODIUM AND PIPERACILLIN SODIUM SCH MLS/HR: 375; 3 INJECTION, SOLUTION INTRAVENOUS at 06:07

## 2020-05-07 RX ADMIN — Medication SCH ML: at 00:35

## 2020-05-07 RX ADMIN — Medication SCH ML: at 14:00

## 2020-05-07 RX ADMIN — SODIUM CHLORIDE SCH MLS/HR: 9 INJECTION, SOLUTION INTRAVENOUS at 07:12

## 2020-05-07 RX ADMIN — TAZOBACTAM SODIUM AND PIPERACILLIN SODIUM SCH MLS/HR: 375; 3 INJECTION, SOLUTION INTRAVENOUS at 00:08

## 2020-05-07 NOTE — NUR
PATIENT'S RIGHT HIP BANDAGE SITE DRAINAGE THROUGH, LEAKING ONTO BED AND COMING OFF FROM I&D 
SITE AND WET WITH STOOL. GAVE PATIENT A BED BATH AND PROVIDED NEW BEDDING. CHANGED OUTER 
DRESSING TO HIP SITE WITH NEW PADDING AND TAPE, CLEANED PATIENT'S PEG SITE WHERE DRAINAGE 
HAD LEAKED AROUND IT AND PROVIDED NEW DRESSING TO PEG SITE. PATIENT WAS GIVEN PAIN 
MEDICATION ORDERED PRIOR TO THIS DUE TO MOANING AND GRUNTING WITH MOVEMENT. PATIENT IS NOW 
RESTING COMFORTABLY WITHOUT MOANING OR GRIMACING, WILL CONTINUE TO MONITOR.

## 2020-05-07 NOTE — PROGRESS NOTE
DATE:    

 

SUBJECTIVE:  The patient is seen and evaluated.  Available labs and notes reviewed.

Discussed with the nurse. 

 

REVIEW OF SYSTEMS:

Unable to obtain review of systems secondary to the patient's medical condition.

 

PHYSICAL EXAMINATION:

VITAL SIGNS:  Temperature 98.4, pulse is 93, respiration 23, and blood pressure 94/55. 

GENERAL:  Seems to be alert, but nonverbal. 

CV:  S1 and S2. 

CHEST:  Decreased breath sounds.  Equal expansion.  No acute distress. 

ABDOMEN:  Soft and nontender. 

HEENT:  Moist.  No pallor.  No JVD. 

EXTREMITIES:  Contracted with the right hip and dressed.

MEDICATIONS:  Medication list reviewed and as far as Infectious Disease point of view,

the patient is on vancomycin IV and Zosyn. 

 

LABORATORY STUDIES:  White count of 8.12, hemoglobin 7.4, and platelet 233.  Sodium 141,

potassium 2.8, and creatinine 0.38.  Serology; COVID-19 PCR not detected, 05/04/2020. 

 

MICROBIOLOGY:  Blood culture one set detected gram-positive cocci with culture

sensitivity pending and the other set was negative in 48 hours.  Previous urine culture

showed ESBL E. coli and the wound culture on 05/04, showed gram-negative rods and

Streptococcus.  Recheck wound culture from most likely surgical timing OR is pending. 

 

RADIOLOGY STUDIES:  No new radiology studies available.

 

ASSESSMENT AND PLAN:  

1. Right hip wound.

2. Possible osteomyelitis.

3. Abnormal blood culture-we will follow up with the cultures.

4. Urinary tract infection with extended-spectrum beta-lactamases/pyelonephritis.

5. The patient with dysphagia and feeding tube.  PEG tube is planned to be replaced.

6. Hypertension.

7. Anemia of chronic disease.

8. Continue with antibiotics as above and follow up with the cultures.

9. The patient had debridement of the wound yesterday.  Spoke with General Surgery.

Apparently, the bone was eroded during the surgery and debridement was down to the bone

with some erosion of the bone.  Discussed with Dr. Godwin.  Discussed with staff.

Further management of this patient is based on daily findings on laboratory and physical

examination.  Please refer to chart for more information. 

 

 

Dictated by Jaden Prather PA-C (Al)

 

______________________________

Jackelin Godwin MD

 

AS/MODL

D:  05/07/2020 08:54:49

T:  05/07/2020 11:05:12

Job #:  [id number]

## 2020-05-07 NOTE — OPERATIVE REPORT
DATE OF PROCEDURE:  05/07/2020

 

SURGEON:  Michael Willoughby MD

 

PROCEDURE:  EGD and PEG tube replacement.

 

INDICATIONS FOR PROCEDURE:  Dysphagia, PEG tube dependent, dysfunctional G-tube.  The

patient is in for an EGD and PEG tube replacement. 

 

MEDICATIONS:  The patient was done under MAC, please see anesthesiologist's note.

 

PROCEDURE IN DETAIL:  With the patient in left lateral decubitus position, a flexible

fiberoptic Olympus gastroscope was introduced into the esophagus under direct

visualization without any difficulty.  There was some patchy erythema noted in distal

esophagus.  The scope was then advanced with ease into the stomach.  Mucosa overlying

the antrum and the body revealed some patchy erythema.  The pylorus was of normal

contour and shape.  It was intubated with ease and the scope was advanced all the way to

the second portion of the duodenum.  The scope was then withdrawn slowly, mucosa

overlying the proximal second portion and the duodenal bulb appeared to be within normal

limits.  The scope was then withdrawn back into the stomach and retroflexed.  Mucosa

overlying the fundus and cardia was grossly within normal limits.  The scope was then

straightened out.  The old G-tube was removed per the pull traction method.  PEG tube

replacement was carried out through the old G-tube stoma.  The scope was subsequently

withdrawn after documenting in a good positioning of the intragastric bumper.  The

patient tolerated the procedure well. 

 

IMPRESSION:  

1. Distal esophagitis, mild.

2. Gastritis, mild.

3. PEG tube replacement carried out through the old G-tube stoma.  The patient tolerated

procedure well.  Can use G-tube upon return to room. 

 

 

 

 

______________________________

Michael Willoughby MD

 

[facility]

D:  05/07/2020 13:47:24

T:  05/07/2020 20:28:56

Job #:  [id number]

 

cc:            Eloise Gómez MD

## 2020-05-07 NOTE — PROGRESS NOTE
DATE:  05/07/2020  

 

SUBJECTIVE:  Ms. Ramirez is a 55-year-old female with severe dementia, a bedridden,

dysphagia, on PEG feedings, right hip decubiti and sacral decubiti, came to the

emergency room because of discharge from that unstageable right hip area.  She underwent

incision and drainage of the area yesterday.  She is on wound care and IV antibiotics.

She is going to go have her PEG tube exchanged today. 

 

PHYSICAL EXAMINATION:

GENERAL:  She opens her eyes.  She is some verbal. 

VITAL SIGNS:  Temperature is 98.4, blood pressure 94/55. 

HEART:  Regular rate. 

LUNGS:  Clear to auscultation. 

ABDOMEN:  Soft.  She has a PEG tube.

LABORATORY DATA:  On the blood work, white count is 8.12, hemoglobin is 7.4, and

hematocrit is 24.5, potassium is 2.8 today.  Calcium is 8.1.  Coronavirus not

nondetected.  Wound culture is showing gram-negative rods __________.  Urine culture is

showing ESBL E. coli. 

 

ASSESSMENT:  

1. Sepsis secondary to decubiti infection and extended-spectrum beta-lactamases urinary

tract infection. 

2. Right hip decubiti unstageable with infection.

3. Multiple decubiti stage IV sacral decubiti.

4. Urinary tract infection with extended-spectrum beta-lactamases Escherichia coli.

5. Severe dementia.

6. Bedridden.

7. Dysphagia.

8. History of hypertension.

9. Hypokalemia.

 

PLAN:  At present time is to replace potassium.  Continue IV antibiotics and wound care.

 She had a debridement of the hip decubiti yesterday.  She is going to have her PEG tube

changed today.  The overall prognosis of this patient is guarded. 

 

I spent more than 35 minutes examining patient, reviewing overnight event, lab results,

and discussing plan of care with nurse. 

 

 

 

 

______________________________

MD HAMLET Dunlap/LANDON

D:  05/07/2020 09:18:23

T:  05/07/2020 11:12:50

Job #:  [id number]

## 2020-05-08 VITALS — DIASTOLIC BLOOD PRESSURE: 63 MMHG | SYSTOLIC BLOOD PRESSURE: 94 MMHG

## 2020-05-08 VITALS — SYSTOLIC BLOOD PRESSURE: 95 MMHG | DIASTOLIC BLOOD PRESSURE: 53 MMHG

## 2020-05-08 VITALS — DIASTOLIC BLOOD PRESSURE: 98 MMHG | SYSTOLIC BLOOD PRESSURE: 87 MMHG

## 2020-05-08 VITALS — SYSTOLIC BLOOD PRESSURE: 101 MMHG | DIASTOLIC BLOOD PRESSURE: 60 MMHG

## 2020-05-08 VITALS — DIASTOLIC BLOOD PRESSURE: 65 MMHG | SYSTOLIC BLOOD PRESSURE: 92 MMHG

## 2020-05-08 VITALS — DIASTOLIC BLOOD PRESSURE: 78 MMHG | SYSTOLIC BLOOD PRESSURE: 118 MMHG

## 2020-05-08 VITALS — SYSTOLIC BLOOD PRESSURE: 119 MMHG | DIASTOLIC BLOOD PRESSURE: 85 MMHG

## 2020-05-08 VITALS — DIASTOLIC BLOOD PRESSURE: 65 MMHG | SYSTOLIC BLOOD PRESSURE: 125 MMHG

## 2020-05-08 VITALS — SYSTOLIC BLOOD PRESSURE: 124 MMHG | DIASTOLIC BLOOD PRESSURE: 94 MMHG

## 2020-05-08 VITALS — DIASTOLIC BLOOD PRESSURE: 64 MMHG | SYSTOLIC BLOOD PRESSURE: 97 MMHG

## 2020-05-08 VITALS — DIASTOLIC BLOOD PRESSURE: 62 MMHG | SYSTOLIC BLOOD PRESSURE: 86 MMHG

## 2020-05-08 LAB
ANION GAP SERPL CALC-SCNC: 10.6 MMOL/L (ref 8–16)
ANION GAP SERPL CALC-SCNC: 10.8 MMOL/L (ref 8–16)
BASE EXCESS BLDA CALC-SCNC: -1 MMOL/L (ref -2–3)
BASOPHILS # BLD AUTO: 0.1 10*3/UL (ref 0–0.1)
BASOPHILS NFR BLD AUTO: 0.4 % (ref 0–1)
BUN SERPL-MCNC: < 5 MG/DL (ref 7–26)
BUN SERPL-MCNC: < 5 MG/DL (ref 7–26)
BUN/CREAT SERPL: 11 (ref 6–25)
BUN/CREAT SERPL: 13 (ref 6–25)
CALCIUM SERPL-MCNC: 8.2 MG/DL (ref 8.4–10.2)
CALCIUM SERPL-MCNC: 8.5 MG/DL (ref 8.4–10.2)
CHLORIDE SERPL-SCNC: 108 MMOL/L (ref 98–107)
CHLORIDE SERPL-SCNC: 109 MMOL/L (ref 98–107)
CO2 SERPL-SCNC: 22 MMOL/L (ref 22–29)
CO2 SERPL-SCNC: 24 MMOL/L (ref 22–29)
DEPRECATED NEUTROPHILS # BLD AUTO: 11.2 10*3/UL (ref 2.1–6.9)
EGFRCR SERPLBLD CKD-EPI 2021: > 60 ML/MIN (ref 60–?)
EGFRCR SERPLBLD CKD-EPI 2021: > 60 ML/MIN (ref 60–?)
EOSINOPHIL # BLD AUTO: 0.2 10*3/UL (ref 0–0.4)
EOSINOPHIL NFR BLD AUTO: 1.4 % (ref 0–6)
ERYTHROCYTE [DISTWIDTH] IN CORD BLOOD: 14.6 % (ref 11.7–14.4)
GLUCOSE SERPLBLD-MCNC: 101 MG/DL (ref 74–118)
GLUCOSE SERPLBLD-MCNC: 78 MG/DL (ref 74–118)
HCO3 BLDA-SCNC: 23 MMOL/L (ref 22–26)
HCT VFR BLD AUTO: 24.4 % (ref 34.2–44.1)
HGB BLD-MCNC: 7.4 G/DL (ref 12–16)
LYMPHOCYTES # BLD: 1.1 10*3/UL (ref 1–3.2)
LYMPHOCYTES NFR BLD AUTO: 8.5 % (ref 18–39.1)
MCH RBC QN AUTO: 28.5 PG (ref 28–32)
MCHC RBC AUTO-ENTMCNC: 30.3 G/DL (ref 31–35)
MCV RBC AUTO: 93.8 FL (ref 81–99)
MONOCYTES # BLD AUTO: 0.6 10*3/UL (ref 0.2–0.8)
MONOCYTES NFR BLD AUTO: 4.3 % (ref 4.4–11.3)
NEUTS SEG NFR BLD AUTO: 85 % (ref 38.7–80)
PCO2 BLDA: 32 MMHG (ref 35–45)
PCO2 BLDA: 76 MMHG (ref 80–105)
PH BLDA: 7.46 [PH] (ref 7.35–7.45)
PLATELET # BLD AUTO: 230 X10E3/UL (ref 140–360)
POTASSIUM SERPL-SCNC: 2.8 MMOL/L (ref 3.5–5.1)
POTASSIUM SERPL-SCNC: 3.6 MMOL/L (ref 3.5–5.1)
RBC # BLD AUTO: 2.6 X10E6/UL (ref 3.6–5.1)
SAO2 % BLDA: 96 % (ref 95–98)
SODIUM SERPL-SCNC: 139 MMOL/L (ref 136–145)
SODIUM SERPL-SCNC: 139 MMOL/L (ref 136–145)

## 2020-05-08 RX ADMIN — ACETAMINOPHEN PRN MG: 10 INJECTION, SOLUTION INTRAVENOUS at 03:05

## 2020-05-08 RX ADMIN — Medication SCH ML: at 02:15

## 2020-05-08 RX ADMIN — MEROPENEM SCH MLS/HR: 500 INJECTION INTRAVENOUS at 21:14

## 2020-05-08 RX ADMIN — Medication SCH ML: at 19:45

## 2020-05-08 RX ADMIN — ACETAMINOPHEN PRN MG: 10 INJECTION, SOLUTION INTRAVENOUS at 23:58

## 2020-05-08 RX ADMIN — Medication SCH ML: at 14:00

## 2020-05-08 RX ADMIN — SODIUM CHLORIDE SCH MLS/HR: 9 INJECTION, SOLUTION INTRAVENOUS at 02:31

## 2020-05-08 RX ADMIN — FAMOTIDINE SCH MG: 10 INJECTION, SOLUTION INTRAVENOUS at 17:24

## 2020-05-08 RX ADMIN — Medication SCH ML: at 23:25

## 2020-05-08 RX ADMIN — FAMOTIDINE SCH MG: 10 INJECTION, SOLUTION INTRAVENOUS at 08:51

## 2020-05-08 RX ADMIN — CASTOR OIL AND BALSAM, PERU SCH GM: 788; 87 OINTMENT TOPICAL at 09:00

## 2020-05-08 RX ADMIN — SODIUM CHLORIDE SCH MLS/HR: 9 INJECTION, SOLUTION INTRAVENOUS at 12:51

## 2020-05-08 RX ADMIN — Medication SCH ML: at 07:33

## 2020-05-08 RX ADMIN — TAZOBACTAM SODIUM AND PIPERACILLIN SODIUM SCH MLS/HR: 375; 3 INJECTION, SOLUTION INTRAVENOUS at 04:51

## 2020-05-08 RX ADMIN — MEROPENEM SCH MLS/HR: 500 INJECTION INTRAVENOUS at 12:51

## 2020-05-08 RX ADMIN — Medication SCH ML: at 17:30

## 2020-05-08 NOTE — NUR
Called pt's  Brian at [phone], call was unable to go thru. Spoke with pt's son 
Low [phone]. 

States wants to speak with Dr. Godwin regarding abx. 

States they previously used [facility] for IV abx and would like to use them again if needed. 
He was also fine with [facility] setting up home health with any company that is in network 
with insurance. 

Choice letter for [facility] placed in chart. 

Pt's son states that pt had peg tube previously and they have supplies at home and formula 
Glucerna 1.2. Pt was receiving bolus feeds at home. 

States the family would like to take pt home on discharge.



CM left message for Dr. Godwin to call pt's son. Will follow up with him for orders 
regarding home IV abx.

## 2020-05-08 NOTE — NUR
DR LUCIANO STATES GTUBE CAN BE USED AND TUBE FEEDING DIET CAN BE RESUMED, PER DIETICIAN NOTES 
WE DO NOT CARRY THE PT HOME DIET AND TO USE JEVITY 1.2 WITH GOAL OF 55 ML/HR EDUCATED HIM OF 
THIS AND STARTED AT 20 ML/HR TO BEGIN. HE ALSO ORDERED FOR THE TYLENOL PO TO BE CHANGED TO 
IV FOR PT FEVER. ADMINISTERED IV TYLENOL AND CONTINUING TO MONITOR PT.

## 2020-05-08 NOTE — NUR
NURSE WENT TO CHECK ON PATIENT AND UPON CHANGING PT POSITION NOTED PATIENT FELT HOT, 
TEMPERATURE TAKEN AND .4. CALLED DR. STONE TO NOTIFY OF NEW ONSET OF FEVER AND TO 
HAVE PO TYLENOL CHANGED TO IV DUE TO PT BEING NPO WITH DYSPHAGIA AND UNABLE TO USE PEG TUBE 
DUE TO REPLACEMENT NOT BEING TIME TO USE. AWAITING CALL BACK.

## 2020-05-08 NOTE — NUR
Dr. Godwin spoke to pt's son over the phone. Discussed options, including home with IV abx 
and hospice. Pt's son said he would like to speak to his brother before making a decision. 
Gave number for CM office for him to callback with decision.

## 2020-05-08 NOTE — PROGRESS NOTE
DATE:  05/08/2020  

 

SUBJECTIVE:  Ms. Ramirez is a 55-year-old female with severe dementia, bedridden,

dysphagia, on PEG feedings, right hip decubiti and sacral decubiti, came to the

emergency room with unstageable right hip decubiti with infection and underwent incision

and drainage.  She is on wound care and IV antibiotics.  PEG tube was exchanged

yesterday.  The patient has been running fever, developed UTI with ESBL E coli.  Her

potassium has been low. 

 

PHYSICAL EXAMINATION:

GENERAL:  She opens her eyes.  She is nonverbal. 

VITAL SIGNS:  Temperature is 99, blood pressure is 101/60. 

HEART:  Regular rate. 

LUNGS:  Poor inspiratory effort. 

ABDOMEN:  Soft.  She has a PEG tube.

LABORATORY DATA:  On the blood work; white count is 13.12, hemoglobin 7.4, hematocrit

24.4.  Potassium came back 2.8 today.  Creatinine is 0.38, glucose is 78.  Coronavirus

came back negative.  Blood culture is showing Staph coagulase negative.  Urine cultures

are showing Klebsiella pneumoniae, ESBL and Streptococcus viridans.  UTI showed ESBL E

coli. 

 

ASSESSMENT AND PLAN:  

1. Sepsis secondary to the urinary tract infection and infected decubiti.

2. Urinary tract infection with extended spectrum beta-lactamases Escherichia coli.

3. Right hip decubiti unstageable.

4. Stage III sacral decubiti that is growing also extended spectrum beta-lactamases

Klebsiella pneumoniae. 

5. Severe dementia.

6. Bedridden.

7. Dysphagia, on percutaneous endoscopic gastrostomy feedings.

8. Hypokalemia.

9. History of hypertension.

 

PLAN:  At present time is to replace potassium.  Continue wound care, IV antibiotics.

Continue to monitor for fever.  PEG tube was changed yesterday.  I think this patient

will be a good candidate for SNF to continue wound care and IV antibiotics.  I spent

more than 35 minutes examining the patient, reviewing overnight event, lab results,

having trying to get a discharge planning for her.  Family wants to take her home.  We

will continue antibiotics and I think the patient will probably benefit of going to SNF. 

 

 

 

 

______________________________

MD HAMLET Dunlap/LANDON

D:  05/08/2020 08:48:45

T:  05/08/2020 10:26:48

Job #:  [id number]

## 2020-05-08 NOTE — PROGRESS NOTE
DATE:    

 

SUBJECTIVE:  The patient is seen and evaluated.  Available labs and notes reviewed.

Discussed with the nurse. 

 

REVIEW OF SYSTEMS:

Unable to obtain review of system secondary to the patient's medical condition.

 

PHYSICAL EXAMINATION:

VITAL SIGNS:  Maximum temperature of 101.4 this morning about 2:28, after that went down

to 100.3 and currently at 99, pulse is 99, respirations 21, and blood pressure is

101/60. 

GENERAL:  Comfortable in bed.  No acute distress.  No interaction with me. 

CV:  S1 and S2. 

CHEST:  Hard to assess, multiple reasons; #1, the patient does not take a deep breath

and #2, she moans every time she exhales, but overall no acute finding. 

ABDOMEN:  Soft and positive bowel sounds. 

HEENT:  Moist.  No pallor.  No JVD. 

EXTREMITIES:  Contracted with right hip wound, on local care.

MEDICATIONS:  Medication list reviewed and as far as Infectious Disease point of view,

on vancomycin IV and Zosyn. 

 

LABORATORY STUDIES:  White count went up from 8.12 to 13.12 with a hemoglobin of 7.4,

which is stable past couple days and platelet count of 230.  Sodium 139, potassium 2.8,

creatinine 0.38, and GFR of greater than the 60.  Vancomycin trough was 4.2 yesterday. 

 

MICROBIOLOGY:  Wound culture recheck is pending, but Gram stain showed no organism.

Blood culture 05/04/2020 is negative 72 hours.  Blood culture on 05/04/2020, one set is

positive for coagulase negative staph.  Wound culture 05/04, showed Klebsiella ESBL and

Streptococcus viridans, which is resistant to Zosyn.  Urine culture 05/04, showed ESBL

Klebsiella, which is sensitive to Zosyn. 

 

IMAGING:  No new radiology studies available.

 

ASSESSMENT AND PLAN:  

1. Fever again, we will get blood culture and order procalcitonin.  Attendings note

reviewed. 

2. Right hip wound.

3. Possible osteomyelitis.

4. Abnormal blood culture as above.

5. Abnormal urine culture as above.

6. Hypertension.

7. Hypotensive at times.

8. Anemia of chronic disease.

9. Leukocytosis again.

10. Low vancomycin trough.

11. The patient is status post debridement down to the bone with some erosion of the

bone during surgical procedure per my discussion with the surgeon.  Please refer to

chart for 

more information.  We will adjust dose of vancomycin IV.  The patient is at risk for

aspiration. 

 

 

Dictated by Jaden Prather PA-C (Al)

 

______________________________

MD AGUILAR Connell/LANDON

D:  05/08/2020 10:24:30

T:  05/08/2020 12:26:58

Job #:  [id number]

## 2020-05-08 NOTE — NUR
DR PHAM ORDERED TO GIVE ONE DOSE 40 MEQ POTASSIUM THROUGH PEG TUBE THEN 6HRS LATER 
ANOTHER DOSE 40MEQ AND RECHECK BMP 6 HOURS FROM THEN. NOTIFIED DR PHAM OF PT GETTING 
FEVER LAST NIGHT AND TYLENOL GIVEN AND CURRENT TEMP OF 99.0. NOTIFIED HER OF PT SON 
REQUESTING DR. STONE CALL AND DISCUSS ANTIBIOTIC THERAPY AS THE SON WANTS HIS MOM TO COME 
HOME WITH HOME HEALTH SHE STATES SHE WILL ROUND AND LET DR STONE KNOW. PASSED THE SON'S 
PHONE NUMBER TO ONCOMING NURSE TO PROVIDE DR'S. SON'S NAME IS NEDA NEGRETE PHONE NUMBER 
[phone].

## 2020-05-08 NOTE — NUR
Infectious disease rounding, made aware patient's wound culture to right hip resulted 
Positive for ESBL and MDRO to right hip wound. Infectious disease to make changes in 
antibiotics.

## 2020-05-08 NOTE — DIAGNOSTIC IMAGING REPORT
Portable chest x-ray 



INDICATION: Wheezing, crackles



COMPARISON: Abdomen x-ray 3/10/2019



FINDINGS: Frontal view of the chest obtained at 1619 hours.  



The cardiac silhouette is normal. The aorta is tortuous.  The pulmonary

vascular markings are normal. 



The lungs demonstrate infiltrates in the mid and inferior lung zones. Right

lung is clear. The right lateral costophrenic detail is sharp. The left lateral

costophrenic angle is blunted. There is no pneumothorax.  



The osseous structures are intact and normal in morphology.



Percutaneous gastrostomy in the left upper quadrant.



IMPRESSION:



Infiltrates in the left lung suggestive of pneumonia with left costophrenic

angle blunting suggestive of small effusion.



Signed by: Dr. Alonso Wiggins MD on 5/8/2020 5:14 PM

## 2020-05-08 NOTE — CONSULTATION
DATE OF CONSULTATION:  

 

Pulmonary Consultation 

 

REASON FOR CONSULT:  Shortness of breath, wheezing.

 

CHIEF COMPLAINT:  The patient was admitted under Dr. Gómez's service on 05/05/2020

with complaints of bleeding right decubitus ulcers. 

 

HISTORY OF PRESENT ILLNESS:  Ms. Ramirez is a 55-year-old female.  She is nonverbal.  She

is bedbound, has dementia.  She has a PEG tube.  She was admitted with bleeding

decubitus ulcer.  [facility] consult was called today for shortness of breath and

wheezing.  She was on IV fluid 125 mL an hour.  Diuretic was given.  Chest x-ray was

done.  The chest x-ray is still pending. 

 

REVIEW OF SYSTEMS:

Unable to elicit any because of the patient's mental status.

 

PAST MEDICAL HISTORY:  The patient has advanced dementia and she is nonverbal.  Past

medical history per the chart, decubitus ulcers, bedbound status, hypertension. 

 

FAMILY AND SOCIAL HISTORY:  Family takes care of her at home.  She is at home.  She has

a PEG tube. 

 

PHYSICAL EXAMINATION:

VITAL SIGNS:  Temperature 99.6, pulse of 80, blood pressure 92/62. 

CHEST:  Wheezing bilaterally. 

HEART:  S1, S2 audible. 

ABDOMEN:  PEG tube.  Soft. 

EXTREMITIES:  Contracted.  No pedal edema.

LABORATORY DATA:  White count of 13,000, hemoglobin 7.4, platelets 230.  Chemistry;

sodium 139, potassium 2.8.  The patient is on meropenem and also received a dose of

vancomycin yesterday.  Chest x-ray is pending.  ABGs reviewed, pH of 7.46, PCO2 of 30,

PO2 of 76, this is on 2 L nasal cannula. 

 

ASSESSMENT:  Ms. Ramirez is a bedbound patient with advanced dementia, came in with

decubitus ulcer bleeding, status post PEG tube placement.  The patient developed

shortness of breath, was on fluid, likely is fluid overload.  The patient is being

treated for Klebsiella ESBL in the wound.  ID is following the patient.  Chest x-ray

films reviewed, showing left lower lobe pneumonia.  Current problems: 

1. Left lower lobe pneumonia, possibility of fluid overload.

2. Bed-bound status.

3. Advanced dementia.

4. Tracheostomy status.

 

PLAN:  Continue the patient on nebulizer treatment as ordered.  Continue IV meropenem,

as ordered by ID.  One dose of vancomycin has been given.  We will continue the patient

on vancomycin 1 g IV b.i.d.  We will follow the patient closely.  If the respiratory

status deteriorates, may require high-flow nasal cannula versus intubation. 

 

Thank you for this consult.

 

 

 

 

______________________________

MD CLINT Cortes/LANDON

D:  05/08/2020 17:19:42

T:  05/08/2020 21:15:44

Job #:  [id number]

## 2020-05-08 NOTE — PROGRESS NOTE
DATE:    

 

I had a very long discussion with the patient's son after I saw the patient.  We talked

about prognosis.  The patient had fever earlier today.  We are going to working up with

blood cultures.  The patient does have underlying dementia and does have osteomyelitis,

but she has been deteriorating recently.  Discussed with the patient and discussed with

the family about hospice care.  We will get blood cultures.  Continue meropenem.  Her

fever could be from antibiotics, it is not so sure.  The family will let me know. 

 

Time spent 45 minutes.

 

 

 

 

______________________________

MD MADAY Connell/LANDON

D:  05/08/2020 15:47:14

T:  05/08/2020 16:50:43

Job #:  [id number]

## 2020-05-08 NOTE — NUR
Nutrition Intervention Note



RD Recommendation(s) for Physician: 

- Continue Jevity 1.2. Recommend goal rate of 55 ml/hr (provides 1584 kcal, 73 gm protein, 
1065 ml water). Water flushes and fluid management per MD. 

- Naren 1 packet BID to promote wound healing

- Vitamin C 500 mg BID to promote wound healing

- Zinc Sulfate 220 mg once a day to promote wound healing



Plan of Care: RD following, monitoring for tolerance and adequacy. TF, supplement, and 
Vit/min rec's. 



Nutrition reason for involvement: follow up

RD Assessment

5/8: Follow up. Pt is currently tolerating Jevity 1.2 @ 30 mL/hr per RN. Recommend 
increasing towards goal rate of 55 mL/hr as medically appropriate and adding Naren BID as 
well as vitamin C and zinc to promote wound healing. Will continue to monitor.



5/5: 55 YOF admitted for decubitus ulcer, sepsis, and UTI. Pt seen today per MD consult and 
MST screen. Pt with hx of advanced dementia and possible autoimmune encephalopathy as well 
as dysphagia and PEG on admit. Pt discussed during MDR. Per RN pt on Glucerna 1.2 at home, 
unable to obtain goal rate at this time and per RN son stated pt sometimes given Ensure via 
PEG. Pt with multiple pressure ulcers on admit, wound care following. Rec's discussed with 
RN on unit and placed in chart for consulting MD. Will continue to monitor. 



Principal Problems/Diagnoses: decubitus ulcer, sepsis, and UTI



PMH: advanced dementia, possible autoimmune encephalopathy, dysphagia, PEG



GI: flat, soft, nontender abdomen, last recorded BM 5/7



Skin: R hip unstagable, multiple PU to buttocks- stage II-III



Labs: 5/8: Na 139, K 2.8, BUN <5, Cr 0.38, Glu 78

5/5: Na 139, K 3.6, BUN 9, Cr 0.41, Gluc 82



Meds: abx, pepcid, zofran



IVF: NS at 100 ml/hr 



Ht: 60 in     

Wt: 100.4 lb     

BMI: 19.6 kg/m2     

IBW: 100 lb



Malnutrition Evaluation (5/5/20)

The patient does not meet criteria for a specified degree of malnutrition at this time. Will 
re-evaluate at follow-up as appropriate. 

Unable to complete assessment.  



Energy intake: NEVILLE

Weight loss: NEVILLE

Fat loss: Mild, very little skinfold thickness to triceps

Muscle loss: Mild, clavicle visible 

Supporting Evidence:

Fluid accumulation: none

Functional Status: unable to evaluate





Nutrition Prescription (Diet Order): Jevity 1.2 @ goal rate of 55 mL/hr



Estimated Nutritional Needs:

0190-3823 calories/day (30-35 kcal/kg CBW) 100.4 lb

68-91 g protein/day (1.5-2 g pro/kg CBW)





Diet Adequacy:  Not meeting calorie needs, Not meeting protein needs at current tube feed 
rate



Diet Tolerance: tolerating tube feeding



Diet Education Needs Assessment: Diet education not indicated, patient on 
temporary/transition diet.



Nutrition Care Level: moderate



Nutrition Diagnosis: Inadequate energy and protein intake related to current medical 
conditions and PEG on admit as evidenced by NPO.



Goal: Patient will meet % of estimated needs by follow up 



Progress: progressing



Interventions:

-Composition, Rate, Route, IVF, Prescription medications, Liquid supplement, 
Multivitamin/mineral supplement therapy





Monitoring/Evaluation:

-Total energy intake, Total protein intake, Formula/Solution, Liquid supplement



Signed: Shelly Lanza RD, LD

## 2020-05-08 NOTE — NUR
Received call back from pt's son. States he spoke with his siblings and they decided they do 
not want hospice at this time. They want to continue with treatments.

## 2020-05-09 VITALS — DIASTOLIC BLOOD PRESSURE: 72 MMHG | SYSTOLIC BLOOD PRESSURE: 105 MMHG

## 2020-05-09 VITALS — SYSTOLIC BLOOD PRESSURE: 108 MMHG | DIASTOLIC BLOOD PRESSURE: 79 MMHG

## 2020-05-09 VITALS — SYSTOLIC BLOOD PRESSURE: 108 MMHG | DIASTOLIC BLOOD PRESSURE: 73 MMHG

## 2020-05-09 VITALS — DIASTOLIC BLOOD PRESSURE: 83 MMHG | SYSTOLIC BLOOD PRESSURE: 118 MMHG

## 2020-05-09 VITALS — SYSTOLIC BLOOD PRESSURE: 109 MMHG | DIASTOLIC BLOOD PRESSURE: 73 MMHG

## 2020-05-09 RX ADMIN — MEROPENEM SCH MLS/HR: 500 INJECTION INTRAVENOUS at 13:50

## 2020-05-09 RX ADMIN — FAMOTIDINE SCH MG: 10 INJECTION, SOLUTION INTRAVENOUS at 09:46

## 2020-05-09 RX ADMIN — Medication SCH ML: at 03:05

## 2020-05-09 RX ADMIN — CASTOR OIL AND BALSAM, PERU SCH GM: 788; 87 OINTMENT TOPICAL at 09:43

## 2020-05-09 RX ADMIN — Medication SCH ML: at 23:00

## 2020-05-09 RX ADMIN — MEROPENEM SCH MLS/HR: 500 INJECTION INTRAVENOUS at 04:37

## 2020-05-09 RX ADMIN — MEROPENEM SCH MLS/HR: 500 INJECTION INTRAVENOUS at 21:22

## 2020-05-09 RX ADMIN — Medication SCH ML: at 14:30

## 2020-05-09 RX ADMIN — Medication SCH ML: at 19:28

## 2020-05-09 RX ADMIN — FAMOTIDINE SCH MG: 10 INJECTION, SOLUTION INTRAVENOUS at 18:07

## 2020-05-09 RX ADMIN — Medication SCH ML: at 10:30

## 2020-05-09 RX ADMIN — Medication SCH ML: at 06:40

## 2020-05-09 NOTE — PROGRESS NOTE
DATE:  05/09/2020  

 

CHIEF COMPLAINT/HISTORY OF PRESENT ILLNESS:  This is a 55-year-old  
woman, whose

primary treating diagnosis is sepsis secondary to bilateral gram-negative rubi 
pneumonia,

Klebsiella pneumoniae, infected right ischial wound and E. coli ESBL urinary 
tract

infection.  The patient's clinical situation is only worsened.  The patient's 
white

blood cell count yesterday on May 8, 2020, was 13,100.  Hemoglobin yesterday was
7.4

g/dL.  The patient underwent a chest film yesterday, which confirmed the left-
sided

gram-negative rubi pneumonia.  The patient's right ischial wound is growing 
Klebsiella

pneumoniae, ESBL bacterial species.  The patient's urine cultures grew E. coli 
ESBL

bacterial species.  The patient is currently on intravenous meropenem.  The 
patient is

bedbound and contracted.  The patient has a stage IV right ischial wound.  The

Infectious Disease specialist and pulmonologist spoke to family yesterday about 
end of

life issues particularly hospice care. 

 

REVIEW OF SYSTEMS:

As per HPI.

 

PHYSICAL EXAMINATION:

GENERAL:  She is nonverbal, bedbound, contracted.  She seems to be her baseline.


VITAL SIGNS:  Height 5 feet 0 inches, weight 100 pounds, BMI 19.  Blood pressure
is

106/72, pulse 78, respiratory rate 22, oxygen saturation 99% on 2 L oxygen, and

temperature 98.3.  Temperature last night was 101 degrees. 

INTEGUMENT:  Skin is warm and dry.  Slight pallor, jaundice, or diaphoresis.  
The

patient has stage II sacral decubitus ulcer.  The patient has stage IV right 
ischial

ulcer. 

HEENT:  Anicteric sclerae.  Moist mucous membranes. 

NECK:  Supple. 

CARDIOVASCULAR:  Tachycardic.  Regular rhythm. 

LUNGS:  The patient has crackles in bilateral lung fields, worse in the left. 

ABDOMEN:  Soft.  She has a G-tube in place, which is functional. 

EXTREMITIES:  She is contracted and bedbound. 

NEUROLOGIC:  Once again bedbound, contracted, does not follow any commands.  She
is

aphasic/nonverbal. 



DIAGNOSES:  

1. Sepsis secondary to left-sided gram-negative pneumonia.

2. Escherichia coli extended-spectrum beta-lactamases urinary tract infection.

3. Klebsiella pneumoniae extended-spectrum beta-lactamases right ischial wound 
infection.

4. Anemia secondary to chronic disease.

5. Severe protein-calorie malnutrition.

6. Severe physical debility.

7. Severe dementia.

 

PLAN:  

1. Code status will be addressed with the family members today.

2. Palliative treatment in the form of home hospice care is very appropriate.

3. At this time, we will continue intravenous meropenem for the patient's 
Klebsiella

pneumoniae ESBL infected right hip wound and E. coli ESBL urinary tract 
infection. 

4. We will follow hemoglobin and hematocrit.

5. Poor overall prognosis. 



I spent 40 minutes in the care of this patient.

 

 

 

 

______________________________

MD DC Boland/LANDON

D:  05/09/2020 11:19:35

T:  05/09/2020 11:48:09

Job #:  [id number]

 

MTDGARRY

## 2020-05-09 NOTE — PROGRESS NOTE
DATE:    

 

SUBJECTIVE:  Ms. Ramirez remains about the same, noncommunicative.

 

OBJECTIVE:  VITAL SIGNS:  Stable, afebrile. 

HEENT:  She is not icteric. 

NECK:  Supple. 

CHEST:  Few rhonchi. 

COR:  S1, S2.  No S3, S4, or murmurs. 

ABDOMEN:  Soft.  Bowel sounds present.  No tenderness. 

EXTREMITIES:  No edema. 

SKIN:  No rash. 

 

Remains noncommunicative, but the patient overnight became short of breath.  Her

laboratory data reviewed.  Her chart reviewed.  Discussed with the medical team.  Her

cultures of wound showing gram-negative bacilli.  Blood culture shows coagulase negative

staph.  Her white count is 13.2, hemoglobin 7.4.  COVID-19 was negative.  Vanc trough

was 4.2. 

 

MEDICATIONS LIST:  The patient is currently on meropenem. 

 

Her chest x-ray showed she had left lower lobe infiltrate.

 

PHYSICAL EXAMINATION:

GENERAL:  She is noncommunicative. 

VITAL SIGNS:  Stable.  Afebrile, currently T-max 101.0. 

HEENT:  She is not icteric. 

NECK:  Supple. 

CHEST:  Few rhonchi. 

COR:  S1 and S2. 

ABDOMEN:  Soft.  Bowel sounds present. 

EXTREMITIES:  No edema. 

SKIN:  No rash.

IMPRESSION:  

1. Pneumonia aspiration.

2. Osteomyelitis of the hip.

3. Dementia, advanced.

4. Bedbound.

5. Status post trach.

6. Prognosis is poor in general. 

Discussed with the family yesterday at length about hospice.  Again, discussed with

attending today.  Continue __________. 

 

 

 

 

______________________________

MD MADAY Connell/LANDON

D:  05/09/2020 11:41:17

T:  05/09/2020 15:00:14

Job #:  [id number]

## 2020-05-09 NOTE — NUR
2045 PATIENT TO RADIOLOGY FOR CT VIA BED WITH RN AND RT ESCORTING, REMAINS ON BIPAP, PATIENT 
REMAINED STABLE THROUGHOUT IMAGING, RETURNED TO ROOM WITHOUT INCIDENT

## 2020-05-10 VITALS — DIASTOLIC BLOOD PRESSURE: 71 MMHG | SYSTOLIC BLOOD PRESSURE: 98 MMHG

## 2020-05-10 VITALS — DIASTOLIC BLOOD PRESSURE: 80 MMHG | SYSTOLIC BLOOD PRESSURE: 106 MMHG

## 2020-05-10 VITALS — DIASTOLIC BLOOD PRESSURE: 70 MMHG | SYSTOLIC BLOOD PRESSURE: 99 MMHG

## 2020-05-10 VITALS — SYSTOLIC BLOOD PRESSURE: 97 MMHG | DIASTOLIC BLOOD PRESSURE: 76 MMHG

## 2020-05-10 VITALS — SYSTOLIC BLOOD PRESSURE: 99 MMHG | DIASTOLIC BLOOD PRESSURE: 70 MMHG

## 2020-05-10 VITALS — DIASTOLIC BLOOD PRESSURE: 73 MMHG | SYSTOLIC BLOOD PRESSURE: 99 MMHG

## 2020-05-10 VITALS — SYSTOLIC BLOOD PRESSURE: 93 MMHG | DIASTOLIC BLOOD PRESSURE: 74 MMHG

## 2020-05-10 LAB
ALBUMIN SERPL-MCNC: 2.1 G/DL (ref 3.5–5)
ALBUMIN/GLOB SERPL: 0.5 {RATIO} (ref 0.8–2)
ALP SERPL-CCNC: 157 IU/L (ref 40–150)
ALT SERPL-CCNC: < 6 IU/L (ref 0–55)
ANION GAP SERPL CALC-SCNC: 9.3 MMOL/L (ref 8–16)
BASOPHILS # BLD AUTO: 0 10*3/UL (ref 0–0.1)
BASOPHILS NFR BLD AUTO: 0.5 % (ref 0–1)
BUN SERPL-MCNC: 8 MG/DL (ref 7–26)
BUN/CREAT SERPL: 19 (ref 6–25)
CALCIUM SERPL-MCNC: 8.2 MG/DL (ref 8.4–10.2)
CHLORIDE SERPL-SCNC: 108 MMOL/L (ref 98–107)
CO2 SERPL-SCNC: 25 MMOL/L (ref 22–29)
DEPRECATED NEUTROPHILS # BLD AUTO: 5.4 10*3/UL (ref 2.1–6.9)
EGFRCR SERPLBLD CKD-EPI 2021: > 60 ML/MIN (ref 60–?)
EOSINOPHIL # BLD AUTO: 0.4 10*3/UL (ref 0–0.4)
EOSINOPHIL NFR BLD AUTO: 4.9 % (ref 0–6)
ERYTHROCYTE [DISTWIDTH] IN CORD BLOOD: 14.9 % (ref 11.7–14.4)
GLOBULIN PLAS-MCNC: 4.6 G/DL (ref 2.3–3.5)
GLUCOSE SERPLBLD-MCNC: 111 MG/DL (ref 74–118)
HCT VFR BLD AUTO: 27.5 % (ref 34.2–44.1)
HGB BLD-MCNC: 8.1 G/DL (ref 12–16)
LYMPHOCYTES # BLD: 1.7 10*3/UL (ref 1–3.2)
LYMPHOCYTES NFR BLD AUTO: 21.4 % (ref 18–39.1)
MCH RBC QN AUTO: 27.2 PG (ref 28–32)
MCHC RBC AUTO-ENTMCNC: 29.5 G/DL (ref 31–35)
MCV RBC AUTO: 92.3 FL (ref 81–99)
MONOCYTES # BLD AUTO: 0.4 10*3/UL (ref 0.2–0.8)
MONOCYTES NFR BLD AUTO: 5.4 % (ref 4.4–11.3)
NEUTS SEG NFR BLD AUTO: 67.5 % (ref 38.7–80)
PLATELET # BLD AUTO: 236 X10E3/UL (ref 140–360)
POTASSIUM SERPL-SCNC: 3.3 MMOL/L (ref 3.5–5.1)
RBC # BLD AUTO: 2.98 X10E6/UL (ref 3.6–5.1)
SODIUM SERPL-SCNC: 139 MMOL/L (ref 136–145)

## 2020-05-10 RX ADMIN — Medication SCH ML: at 07:35

## 2020-05-10 RX ADMIN — MEROPENEM SCH MLS/HR: 500 INJECTION INTRAVENOUS at 13:45

## 2020-05-10 RX ADMIN — FAMOTIDINE SCH MG: 10 INJECTION, SOLUTION INTRAVENOUS at 09:40

## 2020-05-10 RX ADMIN — MEROPENEM SCH MLS/HR: 500 INJECTION INTRAVENOUS at 20:58

## 2020-05-10 RX ADMIN — Medication SCH ML: at 15:30

## 2020-05-10 RX ADMIN — FAMOTIDINE SCH MG: 10 INJECTION, SOLUTION INTRAVENOUS at 16:27

## 2020-05-10 RX ADMIN — CASTOR OIL AND BALSAM, PERU SCH GM: 788; 87 OINTMENT TOPICAL at 09:13

## 2020-05-10 RX ADMIN — Medication PRN MG: at 21:35

## 2020-05-10 RX ADMIN — Medication SCH ML: at 23:06

## 2020-05-10 RX ADMIN — MEROPENEM SCH MLS/HR: 500 INJECTION INTRAVENOUS at 05:41

## 2020-05-10 RX ADMIN — Medication SCH ML: at 11:05

## 2020-05-10 RX ADMIN — Medication SCH ML: at 18:32

## 2020-05-10 RX ADMIN — Medication SCH ML: at 02:46

## 2020-05-10 NOTE — NUR
Report received from morning nurse. Patient is in stable condition, no s/s of distress 
noted. Tele in place. 18g IV left EJ intact. Garcia flowing yellow urine. Bed alarm on. Bed 
low and locked. Call light within reach.

## 2020-05-10 NOTE — NUR
Handoff report to nurse FIDENCIO Chow, made aware goal for tube feed is 55ml/hr, and dressing 
changes to right hip is wet to dry per Dr. Posada's orders.

## 2020-05-10 NOTE — PROGRESS NOTE
DATE:  05/10/2020  

 

CHIEF COMPLAINT/HISTORY OF PRESENT ILLNESS:  This is a 55-year-old  
woman, whose

primary treating diagnosis is sepsis secondary to left-sided gram-negative rubi

pneumonia, Klebsiella pneumoniae, infected right ischial wound, and E coli ESBL 
urinary

tract infection.  I spoke at length with family yesterday and at this time, they
want to

continue full code status.  The patient's hemoglobin today is 8.1 g/dL.  White 
blood

cell count today is 7900 with 67% segmented neutrophils.  The patient's 
creatinine today

is 3.3. 

 

REVIEW OF SYSTEMS:

As per HPI.

 

PHYSICAL EXAMINATION:

GENERAL:  She is awake, nonverbal.  She does not interact.  This is her 
baseline. 

VITAL SIGNS:  Blood pressure is 98/70, pulse 78, respiratory rate is 22, 
temperature

98.7.  Temperature did get as high as 100.4 at midnight, this morning oxygen 
saturation

is 100% on 2 L of oxygen.  BMI is 19. 

INTEGUMENT:  Skin is warm, dry with slight pallor.  No jaundice or diaphoresis. 

HEENT:  Anicteric sclerae with moist mucous membranes. 

NECK:  Supple. 

CARDIOVASCULAR:  Tachycardic rate and rhythm. 

LUNGS:  The patient has crackles in bilateral lung fields. 

ABDOMEN:  Soft.  She has G-tube in place, which is functional. 

EXTREMITIES:  Patient is contracted and bed bound. 

NEUROLOGIC:  The patient is contracted and does not follow in the extremities.  
She is

aphasic/nonverbal. 



DIAGNOSES:  

1. Severe end-stage dementia.

2. Sepsis secondary to left-sided gram-negative rubi pneumonia.

3. Escherichia coli extended-spectrum beta lactamase urinary tract infection.

4. Klebsiella pneumoniae extended-spectrum beta lactamase infected right ischial
wound.

5. Anemia secondary to chronic disease.

6. Severe protein-calorie malnutrition.

7. Severe physical debility.

8. Hypokalemia.

 

PLAN:  

1. Honor the family's wishes and proceed with full code status.

2. However, hospice care is very appropriate for this patient.

3. We will continue intravenous meropenem for the patient's ESBL E. coli urinary
tract

infection and Klebsiella pneumoniae, ESBL right ischial wound infection as well 
as her

gram-negative rubi pneumonia. 

4. Repeat chest x-ray in the morning.

5. Nutritional support.

6. Follow hemoglobin and continue nutrition support.

7. Follow hemoglobin and hematocrit.

8. Overall poor prognosis. 



I spent 30 minutes in the care of the patient.

 

 

 

 

______________________________

MD DC Boland/LANDON

D:  05/10/2020 10:35:00

T:  05/10/2020 11:13:26

Job #:  [id number]

 

MTDGARRY

## 2020-05-11 VITALS — SYSTOLIC BLOOD PRESSURE: 114 MMHG | DIASTOLIC BLOOD PRESSURE: 57 MMHG

## 2020-05-11 VITALS — DIASTOLIC BLOOD PRESSURE: 60 MMHG | SYSTOLIC BLOOD PRESSURE: 127 MMHG

## 2020-05-11 VITALS — SYSTOLIC BLOOD PRESSURE: 111 MMHG | DIASTOLIC BLOOD PRESSURE: 52 MMHG

## 2020-05-11 VITALS — DIASTOLIC BLOOD PRESSURE: 47 MMHG | SYSTOLIC BLOOD PRESSURE: 100 MMHG

## 2020-05-11 VITALS — DIASTOLIC BLOOD PRESSURE: 79 MMHG | SYSTOLIC BLOOD PRESSURE: 117 MMHG

## 2020-05-11 VITALS — SYSTOLIC BLOOD PRESSURE: 117 MMHG | DIASTOLIC BLOOD PRESSURE: 79 MMHG

## 2020-05-11 VITALS — SYSTOLIC BLOOD PRESSURE: 111 MMHG | DIASTOLIC BLOOD PRESSURE: 76 MMHG

## 2020-05-11 LAB
ANION GAP SERPL CALC-SCNC: 11.2 MMOL/L (ref 8–16)
BASOPHILS # BLD AUTO: 0 10*3/UL (ref 0–0.1)
BASOPHILS NFR BLD AUTO: 0.4 % (ref 0–1)
BUN SERPL-MCNC: 10 MG/DL (ref 7–26)
BUN/CREAT SERPL: 23 (ref 6–25)
CALCIUM SERPL-MCNC: 8.4 MG/DL (ref 8.4–10.2)
CHLORIDE SERPL-SCNC: 108 MMOL/L (ref 98–107)
CO2 SERPL-SCNC: 22 MMOL/L (ref 22–29)
DEPRECATED NEUTROPHILS # BLD AUTO: 4.9 10*3/UL (ref 2.1–6.9)
EGFRCR SERPLBLD CKD-EPI 2021: > 60 ML/MIN (ref 60–?)
EOSINOPHIL # BLD AUTO: 0.3 10*3/UL (ref 0–0.4)
EOSINOPHIL NFR BLD AUTO: 3.7 % (ref 0–6)
ERYTHROCYTE [DISTWIDTH] IN CORD BLOOD: 15.1 % (ref 11.7–14.4)
GLUCOSE SERPLBLD-MCNC: 110 MG/DL (ref 74–118)
HCT VFR BLD AUTO: 25.2 % (ref 34.2–44.1)
HGB BLD-MCNC: 7.2 G/DL (ref 12–16)
LYMPHOCYTES # BLD: 2.1 10*3/UL (ref 1–3.2)
LYMPHOCYTES NFR BLD AUTO: 27.3 % (ref 18–39.1)
MCH RBC QN AUTO: 27.6 PG (ref 28–32)
MCHC RBC AUTO-ENTMCNC: 28.6 G/DL (ref 31–35)
MCV RBC AUTO: 96.6 FL (ref 81–99)
MONOCYTES # BLD AUTO: 0.4 10*3/UL (ref 0.2–0.8)
MONOCYTES NFR BLD AUTO: 5.6 % (ref 4.4–11.3)
NEUTS SEG NFR BLD AUTO: 62.6 % (ref 38.7–80)
PLATELET # BLD AUTO: 255 X10E3/UL (ref 140–360)
POTASSIUM SERPL-SCNC: 4.2 MMOL/L (ref 3.5–5.1)
RBC # BLD AUTO: 2.61 X10E6/UL (ref 3.6–5.1)
SODIUM SERPL-SCNC: 137 MMOL/L (ref 136–145)

## 2020-05-11 RX ADMIN — CASTOR OIL AND BALSAM, PERU SCH GM: 788; 87 OINTMENT TOPICAL at 09:17

## 2020-05-11 RX ADMIN — FAMOTIDINE SCH MG: 10 INJECTION, SOLUTION INTRAVENOUS at 17:17

## 2020-05-11 RX ADMIN — Medication SCH ML: at 17:35

## 2020-05-11 RX ADMIN — Medication SCH ML: at 11:50

## 2020-05-11 RX ADMIN — Medication SCH ML: at 20:00

## 2020-05-11 RX ADMIN — Medication SCH ML: at 02:26

## 2020-05-11 RX ADMIN — MEROPENEM SCH MLS/HR: 500 INJECTION INTRAVENOUS at 05:00

## 2020-05-11 RX ADMIN — Medication SCH ML: at 06:55

## 2020-05-11 RX ADMIN — MEROPENEM SCH MLS/HR: 500 INJECTION INTRAVENOUS at 20:57

## 2020-05-11 RX ADMIN — FAMOTIDINE SCH MG: 10 INJECTION, SOLUTION INTRAVENOUS at 09:17

## 2020-05-11 RX ADMIN — Medication SCH ML: at 00:05

## 2020-05-11 RX ADMIN — MEROPENEM SCH MLS/HR: 500 INJECTION INTRAVENOUS at 13:00

## 2020-05-11 NOTE — PROGRESS NOTE
DATE:  05/11/2020  

 

SUBJECTIVE:  Ms. Ramirez is a 55-year-old female with severe dementia, bedridden,

dysphagia, on PEG feedings, right hip decubiti, sacral decubiti with infection, who came

to the emergency room because of discharge from the right hip decubitus.  She underwent

incision and drainage.  She was started on IV antibiotics and wound care.  The patient

also developed UTI with ESBL E coli. 

 

PHYSICAL EXAMINATION:

GENERAL:  Today, she opens her eyes, but she is nonverbal.  She is in a fatal position.

Contractures of her legs and arms. 

VITAL SIGNS:  Temperature is 97.7, blood pressure 127/60. 

HEART:  Regular rate. 

LUNGS:  Poor inspiratory effort. 

ABDOMEN:  Soft.  She has a PEG tube that was changed recently.

LABORATORY DATA:  On the blood work; white count 7.79, hemoglobin 7.2, hematocrit 25.2.

Potassium 4.2, creatinine is 0.43.  Urine culture showed ESBL E coli.  Wound culture

show ESBL Klebsiella pneumoniae and Streptococcus viridans.  Blood cultures show Staph.

Another wound culture showed gram-negative bacillus. 

 

ASSESSMENT:  

1. Sepsis secondary to urinary tract infection and infected decubiti.

2. Severe end-stage dementia.

3. Urinary tract infection with extended spectrum beta-lactamases Escherichia coli.

4. Right hip infected decubiti unstageable with extended spectrum beta-lactamases

Klebsiella pneumoniae. 

5. Bedridden.

6. Dysphagia, on percutaneous endoscopic gastrostomy feedings.

7. Hypokalemia, resolved.

8. History of hypertension.

 

PLAN:  At the present time is to continue wound care, IV antibiotics.  The overall

prognosis of the patient is poor.  She will be a good candidate for hospice.  Family

wishes for the patient full code.  The family does not agree with hospice, probably the

best option for her will be to go to SNF to continue wound care and IV antibiotics.  The

overall prognosis of the patient stays poor due to all her medical conditions and the

severe debilitating dementia. 

 

I spent more than 30 minutes examining the patient, reviewing overnight event, lab

results, and discussing plan of care with nurse. 

 

 

 

 

______________________________

MD HAMLET Dunlap/LANDON

D:  05/11/2020 08:41:03

T:  05/11/2020 09:33:13

Job #:  [id number]

## 2020-05-11 NOTE — PROGRESS NOTE
DATE:  

 

Infectious Disease 

 

SUBJECTIVE:  The patient is seen and evaluated.  Available labs and notes reviewed.

 

REVIEW OF SYSTEMS:

Unable to obtain review of systems against patient's medical condition.

 

OBJECTIVE:  VITAL SIGNS:  Temperature 98.5, pulse is 89, respirations 20, and blood

pressure 111/52, maximum temperature was 100.4, night before last night, midnight. 

GENERAL:  Comfortable in bed.  No acute distress. 

EYES:  Open, not verbal with me. 

CV:  S1 and S2. 

CHEST:  Equal expansion.  Decreased breath sounds.  No acute distress. 

ABDOMEN:  Soft, nontender. 

HEENT:  Moist.  No pallor.  No JVD. 

EXTREMITIES:  Contracted.

 

MEDICATIONS:  Medication list reviewed.  As far as Infectious Disease point of view,

patient is on meropenem. 

 

LABORATORY STUDIES:  White blood cells of 7.79, hemoglobin 7.2, and platelet 255.

Sodium 137, potassium 4.2, creatinine 0.43.  Toxicology; vancomycin trough was 4.2 on

05/07/2020; however, patient is only on meropenem right now. 

 

SEROLOGY:  No new serology available.

 

MICROBIOLOGY:  No new microbiology studies available.

 

RADIOLOGY:  Chest x-ray was done today at 7 o'clock showing interval decrease in the

left-sided airspace opacity.  No evidence of a new consolidation or pneumothorax. 

 

ASSESSMENT AND PLAN:  

1. Aspiration pneumonia.

2. Osteomyelitis of the hip, status post debridement.

3. Advanced dementia.

4. Bed bound.

5. Dysphagia.

6. Leukocytosis, resolved. 

 

Overall very ill, patient remains on meropenem, PEG placed by GI.  Currently monitor the

patient clinically and follow with the labs. 

 

Attending note reviewed.  Family wish the patient to be full code at this point per

Internal Medicine and the plan is for skilled nursing facility.  Please refer to chart

for more information. 

 

 

 

______________________________

MD MADAY Connell/LANDON

D:  05/11/2020 09:46:35

T:  05/11/2020 10:42:11

Job #:  [id number]

## 2020-05-11 NOTE — NUR
PATIENT IN BED WITH HEAD OF BED ELEVATED RECEIVING NEB TREATMENT, NO DISTRESS NOTED. TUBE 
FEEDING INFUSING AS ORDERED,HEEL PROTECTOR IN PLACE.  BED IN LOWER POSITION, CALL LIGHT AT 
REACH.

## 2020-05-11 NOTE — NUR
walking round complete, pt eyes open resp even, no distress noted at this time, will cont to 
monitor.

## 2020-05-11 NOTE — NUR
Bedside rounds completed with morning nurse. Pt alert to tactile stimulation. Bedbound, 
total care, contractured. Aphasic. No s/s of pain. Garcia flowing yellow urine. GT tube in 
place. Bed low and locked. Call light within reach.

## 2020-05-11 NOTE — NUR
SPOKE WITH SON NEDA WHOM STATES TO CALL [facility] [phone], CALLED AND SPOKE WITH HER 
ABOUT PLAN OF CARE, ABLE TO GIVE IN NETWORK FACILITIES, [facility], [facility], AND [facility]. DAUGHTER AGREED TO [facility] FOR ABX FOR 6 WEEKS, WHICH 
IS WHAT SHE WAS TOLD THE PLAN WAS, THEN THEY WILL MAKE DECISION TO DO HOME HEALTH OR HOSPICE 
AT THAT POINT.

## 2020-05-11 NOTE — DIAGNOSTIC IMAGING REPORT
EXAM:  CHEST SINGLE (PORTABLE)



DATE: 5/11/2020 5:50 AM  



INDICATION: Pneumonia  



COMPARISON: 5/8/2020



FINDINGS:

The trachea is midline. There has been interval improvement in appearance of

left-sided airspace opacities. There is no evidence for new large focal

consolidation, pneumothorax, or significant pleural effusion.



The mediastinal silhouette is stable in appearance. No acute osseous

abnormalities identified.





IMPRESSION:



Interval decrease in left-sided airspace opacities. No evidence for new

consolidation or pneumothorax.



Signed by: Dr. Dm Apple MD on 5/11/2020 9:31 AM

## 2020-05-12 VITALS — DIASTOLIC BLOOD PRESSURE: 65 MMHG | SYSTOLIC BLOOD PRESSURE: 117 MMHG

## 2020-05-12 VITALS — SYSTOLIC BLOOD PRESSURE: 107 MMHG | DIASTOLIC BLOOD PRESSURE: 66 MMHG

## 2020-05-12 VITALS — SYSTOLIC BLOOD PRESSURE: 120 MMHG | DIASTOLIC BLOOD PRESSURE: 75 MMHG

## 2020-05-12 VITALS — DIASTOLIC BLOOD PRESSURE: 80 MMHG | SYSTOLIC BLOOD PRESSURE: 120 MMHG

## 2020-05-12 VITALS — SYSTOLIC BLOOD PRESSURE: 108 MMHG | DIASTOLIC BLOOD PRESSURE: 69 MMHG

## 2020-05-12 VITALS — SYSTOLIC BLOOD PRESSURE: 109 MMHG | DIASTOLIC BLOOD PRESSURE: 56 MMHG

## 2020-05-12 RX ADMIN — Medication SCH ML: at 07:00

## 2020-05-12 RX ADMIN — MEROPENEM SCH MLS/HR: 500 INJECTION INTRAVENOUS at 13:00

## 2020-05-12 RX ADMIN — Medication SCH ML: at 11:05

## 2020-05-12 RX ADMIN — Medication SCH ML: at 19:30

## 2020-05-12 RX ADMIN — FAMOTIDINE SCH MG: 10 INJECTION, SOLUTION INTRAVENOUS at 09:00

## 2020-05-12 RX ADMIN — Medication SCH ML: at 03:50

## 2020-05-12 RX ADMIN — MEROPENEM SCH MLS/HR: 500 INJECTION INTRAVENOUS at 21:00

## 2020-05-12 RX ADMIN — Medication SCH ML: at 15:35

## 2020-05-12 RX ADMIN — SODIUM CHLORIDE SCH MLS/HR: 9 INJECTION, SOLUTION INTRAVENOUS at 11:32

## 2020-05-12 RX ADMIN — MEROPENEM SCH MLS/HR: 500 INJECTION INTRAVENOUS at 06:10

## 2020-05-12 RX ADMIN — CASTOR OIL AND BALSAM, PERU SCH GM: 788; 87 OINTMENT TOPICAL at 11:32

## 2020-05-12 RX ADMIN — FAMOTIDINE SCH MG: 10 INJECTION, SOLUTION INTRAVENOUS at 17:07

## 2020-05-12 RX ADMIN — Medication SCH ML: at 22:59

## 2020-05-12 NOTE — PROGRESS NOTE
DATE:  05/12/2020  

 

SUBJECTIVE:  Ms. Ramirez is a 55-year-old female with history of severe dementia,

bedridden, dysphagia, on PEG feedings, came with the right hip decubiti infection.  She

underwent incision and drainage of the decubiti.  She also has several other decubiti

with chronic osteomyelitis.  She was started on IV antibiotics.  Urine showed ESBL E

coli.  There is also suspicion of aspiration pneumonia. 

 

PHYSICAL EXAMINATION:

GENERAL:  Today, she opens her eyes. 

VITAL SIGNS:  Temperature is 99.2, blood pressure 117/65. 

HEART:  Regular rate. 

LUNGS:  Poor inspiratory effort. 

ABDOMEN:  Soft.

LABORATORY DATA:  On the blood work; white count 7.79, hemoglobin 7.2, hematocrit 25.2,

potassium 4.2, creatinine is 0.43, and glucose is 110.  Wound culture shows

gram-negative bacilli.  Urine shows ESBL UTI.  Previous wound culture showed ESBL

Klebsiella pneumoniae and Streptococcus viridans. 

 

ASSESSMENT:  

1. Sepsis secondary to urinary tract infection and infected decubiti.

2. Probably aspiration pneumonia.

3. Severe end-stage dementia.

4. Urinary tract infection with extended spectrum beta-lactamases Escherichia coli.

5. Right hip infected decubiti with extended spectrum beta-lactamases Klebsiella

pneumoniae. 

6. Bedridden.

7. Dysphagia, on percutaneous endoscopic gastrostomy feedings.

8. History of hypertension.

 

PLAN:  At present time is to continue wound care, IV antibiotics.  She had her PEG tube

changed.  Aspiration precautions.  Family wishes is having her full code and they decide

for SNF.  So, we will have a SNF evaluation once stable.  We are going to probably

discharge her today and continue IV antibiotics.  Overall prognosis of the patient is

poor due to all her medical conditions and her severe dementia. 

 

I spent more than 30 minutes examining the patient, reviewing overnight event, lab

results, and x-rays. 

 

 

 

 

______________________________

MD HAMLET Dunlap/LANDON

D:  05/12/2020 08:59:35

T:  05/12/2020 09:30:38

Job #:  [id number]

## 2020-05-12 NOTE — NUR
PATIENT REPOSITIONED IN BED, HEAD OF BED ELEVATED, NO DISTRESS NOTED. MOUTH CARE PROVIDED. 
IN BED WITH CALL LIGHT AT REACH. Former smoker

## 2020-05-12 NOTE — NUR
PATIENT NOTED WITH RESPIRATION OF 32. RT IN ROOM AT THE TIME, NEB TREATMENT GIVEN. V/S 
98.1-32-/75 % ON O2 AT 2L VIA N/C. PATIENT ON PEG TUBE FEEDING, RESIDUAL 
CHECKED WITH 0 CC NOTED. PATIENT REPOSITIONED IN BED. CPT TREATMENT GIVEN BY RT. AT THE 
COMPLETION OF TREATMENT, PATIENT RESPIRATION AT 24 AT THIS TIME. V/S RECHECKED 
98.2-24-/65 AND 96% ON O2 AT 2L. WILL CLOSELY MONITOR.

## 2020-05-12 NOTE — CONSULTATION
DATE OF CONSULTATION:  05/05/2020  

 

HISTORY OF PRESENT ILLNESS:  The patient is a 55-year-old female who was admitted to the

hospital with drainage from the right hip ulcer.  The patient has a history of dementia.

 In the nursing home, she was noted to have bloody drainage from the ulcer and she was

sent to the hospital. 

 

PAST MEDICAL HISTORY:  Significant for dementia for which she is fed with a gastrostomy

tube.  She has a history of hypertension.  She is bedridden. 

 

MEDICATIONS:  Listed in the chart.

 

PAST SURGICAL HISTORY:  No history of previous surgery.

 

FAMILY HISTORY:  Cannot be obtained.

 

SOCIAL HISTORY:  The patient lives at home.  Does not smoke cigarettes or drink alcohol

ever in her history. 

 

REVIEW OF SYSTEMS:

Cannot be obtained.

 

PHYSICAL EXAMINATION:

GENERAL:  The patient is awake, does not answer questions. 

VITAL SIGNS:  Essentially normal. 

HEENT:  There was no scleral icterus. 

NECK:  Had no masses. 

LUNGS:  Equal breath sounds bilaterally. 

CARDIAC:  Regular rate and rhythm. 

ABDOMEN:  Soft. There is a gastrostomy tube on the left side of the abdomen. 

EXTREMITIES:  There are contractions in the right hip open area with bloody purulent

drainage with some undermining.  There is a more superficial ulcer in the sacral area. 

LABORATORY TESTS:  White blood cell count 8000, hemoglobin 7, hematocrit 24.

 

ASSESSMENT:  A 55-year-old female with right hip ulcer that appears to be infected.  She

will benefit from drainage of the area with debridement of the ulcer to eliminate the

large undermined area, plan surgery for tomorrow. 

 

Thank you for asking me to see Ms. Ramirez.

 

 

 

 

______________________________

MD TOYIN Gupta/LANDON

D:  05/12/2020 07:00:01

T:  05/12/2020 13:03:11

Job #:  [id number]

## 2020-05-12 NOTE — PROGRESS NOTE
DATE:    

 

SUBJECTIVE:  The patient is seen and evaluated.  Available labs and notes reviewed.

 

REVIEW OF SYSTEMS:

Unable to obtain review of systems secondary to the patient's medical condition.

Discussed with the staff and case management, possibly to SNF to [facility] for

continuation of her medical care. 

 

PHYSICAL EXAMINATION:

VITAL SIGNS:  Temperature 99.2, pulse is 96, respirations 20, and blood pressure 117/65. 

GENERAL:  Comfortably in bed, not much interaction with me.  No acute distress. 

CV:  S1 and S2. 

CHEST:  Equal expansion.  Decreased breath sounds.  No acute distress. 

HEENT:  Moist.  No pallor.  No JVD. 

EXTREMITIES:  Contracted with right hip surgical wound, on local care. 

ABDOMEN:  Soft and not distended.

MEDICATIONS:  As far as Infectious Disease point of view, the patient is on meropenem.

 

LABORATORY STUDIES:  No new CBC or BMP from today.  Toxicology, no new toxicology

results.  Serology, coronavirus not detected on PCR from 05/04/2020. 

 

MICROBIOLOGY:  No new microbiology studies available.  However, the urine was ESBL E.

coli, sensitive to Merrem.  Wound culture showed Streptococcus viridans and Klebsiella

with Klebsiella sensitive to meropenem.  Recheck wound cultures showed gram-negative

bacilli, which refers back to workup that was done which showed the Klebsiella and

strep. 

 

RADIOLOGY STUDIES:  No new radiology studies available.  Chest x-ray from yesterday

showed interval decrease in the left-sided airspace opacity.  No evidence of new

consolidation or pneumothorax. 

 

ASSESSMENT AND PLAN:  

1. Aspiration pneumonia.

2. Osteomyelitis of the right hip, status post debridement.

3. Advanced dementia.

4. Dysphagia with feeding tube.

5. Bedbound.

6. Leukocytosis, resolved.  The patient is status post PEG tube placed and debridement

of the right hip.  Cultures as above.  Remains on Merrem.  Discharge planning is noted

for skilled nursing facility most likely [facility].  Continue with IV antibiotics

and wound care.  Elevate the back of the bed at least 30 degrees at all times to

decrease the chance of aspiration pneumonia.  Overall with guarded prognosis.  Discussed

with Dr. Godwin in details. 

 

 

Dictated by Jaden Prather PA-C (Al)

 

______________________________

Jackelin Godwin MD

 

AS/MODL

D:  05/12/2020 10:25:34

T:  05/12/2020 10:55:44

Job #:  [id number]

## 2020-05-13 VITALS — SYSTOLIC BLOOD PRESSURE: 121 MMHG | DIASTOLIC BLOOD PRESSURE: 71 MMHG

## 2020-05-13 VITALS — DIASTOLIC BLOOD PRESSURE: 58 MMHG | SYSTOLIC BLOOD PRESSURE: 101 MMHG

## 2020-05-13 VITALS — DIASTOLIC BLOOD PRESSURE: 60 MMHG | SYSTOLIC BLOOD PRESSURE: 112 MMHG

## 2020-05-13 VITALS — DIASTOLIC BLOOD PRESSURE: 53 MMHG | SYSTOLIC BLOOD PRESSURE: 120 MMHG

## 2020-05-13 VITALS — DIASTOLIC BLOOD PRESSURE: 70 MMHG | SYSTOLIC BLOOD PRESSURE: 123 MMHG

## 2020-05-13 VITALS — DIASTOLIC BLOOD PRESSURE: 51 MMHG | SYSTOLIC BLOOD PRESSURE: 99 MMHG

## 2020-05-13 RX ADMIN — Medication SCH ML: at 23:30

## 2020-05-13 RX ADMIN — FAMOTIDINE SCH MG: 10 INJECTION, SOLUTION INTRAVENOUS at 16:24

## 2020-05-13 RX ADMIN — Medication SCH ML: at 20:20

## 2020-05-13 RX ADMIN — Medication SCH ML: at 07:10

## 2020-05-13 RX ADMIN — Medication SCH ML: at 15:00

## 2020-05-13 RX ADMIN — MEROPENEM SCH MLS/HR: 500 INJECTION INTRAVENOUS at 22:20

## 2020-05-13 RX ADMIN — Medication PRN MG: at 04:22

## 2020-05-13 RX ADMIN — SODIUM CHLORIDE SCH MLS/HR: 9 INJECTION, SOLUTION INTRAVENOUS at 10:00

## 2020-05-13 RX ADMIN — MEROPENEM SCH MLS/HR: 500 INJECTION INTRAVENOUS at 05:48

## 2020-05-13 RX ADMIN — Medication SCH ML: at 02:42

## 2020-05-13 RX ADMIN — Medication SCH ML: at 11:00

## 2020-05-13 RX ADMIN — FAMOTIDINE SCH MG: 10 INJECTION, SOLUTION INTRAVENOUS at 09:00

## 2020-05-13 RX ADMIN — MEROPENEM SCH MLS/HR: 500 INJECTION INTRAVENOUS at 13:00

## 2020-05-13 RX ADMIN — CASTOR OIL AND BALSAM, PERU SCH GM: 788; 87 OINTMENT TOPICAL at 09:00

## 2020-05-13 NOTE — NUR
WOUND CARE CONSULT FOR SCREENING R/T 8 MANSOOR SCORE   MAINTAINING STRICT PUP STATUS AND 
INTERVENTIONS MD ORDER IN PLACE FOR WOUND CARE  WET TO DRY DRESSINGS DAILY AND PRN

-------------------------------------------------------------------------------

Addendum: 05/13/20 at 1329 by Berlin Blanco RN

-------------------------------------------------------------------------------

Amended: Links added.

## 2020-05-13 NOTE — NUR
RCD PT AT BED PT IS ALERT AND ORIENTED IN 1-2 ,PT RESTING ON BED IV PATENT BY SALINE FLUSH 
,SORENSON DRAINING BY GRAVITY PEG TUBE FEED RUNNING ,CHANGED THE POSITION ON RT SIDE BED LOW 
AND LOCKED CALL LIGHT IN REACH

## 2020-05-13 NOTE — DIAGNOSTIC IMAGING REPORT
EXAMINATION:  CHEST XRAY LINE PLACEMENT    



INDICATION: Line placement



COMPARISON: Chest radiograph 5/11/2020

     

FINDINGS:



LINES/TUBES:Interval placement of right PICC line terminating in the superior

vena cava. EKG leads overlie the chest.



LUNGS:The lungs are moderately inflated. New left basilar airspace opacity

silhouetting the left hemidiaphragm.



PLEURA:No pleural effusion or pneumothorax.



MEDIASTINUM:The cardiomediastinal silhouette appears unchanged in size and

shape.



BONES/SOFT TISSUES:No acute osseous injury.



ABDOMEN:No free air under the diaphragm.





IMPRESSION: 

Right PICC line terminates in the SVC.



New left basilar airspace opacity could represent atelectasis versus aspiration

and/or pneumonia in the proper clinical setting.



Signed by: Devante Miles MD on 5/13/2020 9:45 AM

## 2020-05-13 NOTE — PROGRESS NOTE
DATE:    

 

SUBJECTIVE:  The patient is seen and evaluated.  Available labs and notes reviewed.

Discussed with staff.  Status post right upper extremity PICC line. 

 

REVIEW OF SYSTEMS:

Unable to obtain review of systems secondary to the patient's medical condition.

 

PHYSICAL EXAMINATION:

VITAL SIGNS:  Temperature 98.5.  The patient had a maximum temperature of 100.1 this

morning at 4 o'clock, pulse 87, respiration 20, and blood pressure 101/58. 

GENERAL:  Comfortable in bed, leaning on her left side.  Right upper extremity PICC line

noted.  Nonverbal to me, eyes open and seems to be awake. 

CV:  S1 and S2. 

CHEST:  Equal expansion. 

HEENT:  Moist.  No pallor.  No JVD. 

EXTREMITIES:  Contracted with right hip surgical wound, on local care.

MEDICATIONS:  Medication list reviewed and as far as Infectious Disease point of view,

the patient is on meropenem. 

 

LABORATORY STUDIES:  White count of 7.79 on 05/11/2020 with a creatinine level of 0.43.

No new CBC or BMP from today. 

 

MICROBIOLOGY:  No new microbiology studies available.

 

RADIOLOGY STUDIES:  No new radiology studies available.

 

ASSESSMENT AND PLAN:  

1. Aspiration pneumonia.

2. Right hip wound deep with bone exposed.

3. Osteomyelitis of right hip.

4. Advanced dementia.

5. Contracted extremity and bedbound.

6. Dysphagia.

7. Leukocytosis, resolved.

8. General Surgery noted.  Plan for debridement of the right hip wound and eliminating

the undermining. 

9. The patient remains on meropenem.  Family refused hospice and wants the patient to be

transferred to skilled nursing facility for continuation of her medical care.  The

patient is status post right upper extremity PICC line for long-term IV antibiotics.

Please refer to chart for more information.  Discussed with Dr. Godwin in details. 

 

 

Dictated by Jaden Prather PA-C (Al)

 

______________________________

Jackelin Godwin MD

 

AS/MODL

D:  05/13/2020 10:28:48

T:  05/13/2020 11:37:07

Job #:  [id number]

## 2020-05-13 NOTE — NUR
Patient received lying in bed. AAO x 2. No signs of pain or respiratory distress. 2L NC and 
telemetry box in place. HOB elevated. Tube feeding (Jevity 1.5) infusing at 55 cc/hr. Safety 
measures in place. Call light within reach.

## 2020-05-13 NOTE — NUR
Nutrition Intervention Note



RD Recommendation(s) for Physician: 

- Continue Jevity 1.2. Recommend goal rate of 55 ml/hr (provides 1584 kcal, 73 gm protein, 
1065 ml water). Water flushes and fluid management per MD. 

- Naren 1 packet BID to promote wound healing

- Vitamin C 500 mg BID to promote wound healing

- Zinc Sulfate 220 mg once a day to promote wound healing



Plan of Care: RD following, monitoring for tolerance and adequacy. TF, supplement, and 
Vit/min rec's. 



Nutrition reason for involvement: follow up



RD Assessment

5/13: Follow up. Pt discussed at MDR, plan for surgery today. TF off currently, held for 
procedure- debridement of R hip. Pt previously tolerating TF. Plan for pt to go to SNF. 
Current rec's remain appropriate. Will continue to monitor. 



5/8: Follow up. Pt is currently tolerating Jevity 1.2 @ 30 mL/hr per RN. Recommend 
increasing towards goal rate of 55 mL/hr as medically appropriate and adding Naren BID as 
well as vitamin C and zinc to promote wound healing. Will continue to monitor.



5/5: 55 YOF admitted for decubitus ulcer, sepsis, and UTI. Pt seen today per MD consult and 
MST screen. Pt with hx of advanced dementia and possible autoimmune encephalopathy as well 
as dysphagia and PEG on admit. Pt discussed during MDR. Per RN pt on Glucerna 1.2 at home, 
unable to obtain goal rate at this time and per RN son stated pt sometimes given Ensure via 
PEG. Pt with multiple pressure ulcers on admit, wound care following. Rec's discussed with 
RN on unit and placed in chart for consulting MD. Will continue to monitor. 



Principal Problems/Diagnoses: decubitus ulcer, sepsis, and UTI



PMH: advanced dementia, possible autoimmune encephalopathy, dysphagia, PEG



GI: flat, soft, nontender abdomen, last recorded BM 5/13 x 2



Skin: R hip unstagable, multiple PU to buttocks- stage II-III



Labs: 5/13: Na 137, K 4.2, BUN 10, Cr 0.43, Gluc 110, Ca 8.4

5/8: Na 139, K 2.8, BUN <5, Cr 0.38, Glu 78

5/5: Na 139, K 3.6, BUN 9, Cr 0.41, Gluc 82



Meds: IV Fe, abx, pepcid, morphine, zofran





Ht: 60 in     

Wt: 100.4 lb     

BMI: 19.6 kg/m2     

IBW: 100 lb



Malnutrition Evaluation (5/5/20)

The patient does not meet criteria for a specified degree of malnutrition at this time. Will 
re-evaluate at follow-up as appropriate. 

Unable to complete assessment.  



Energy intake: NEVILLE

Weight loss: NEVILLE

Fat loss: Mild, very little skinfold thickness to triceps

Muscle loss: Mild, clavicle visible 

Supporting Evidence:

Fluid accumulation: none

Functional Status: unable to evaluate





Nutrition Prescription (Diet Order): Jevity 1.2 @ goal rate of 55 mL/hr



Estimated Nutritional Needs:

0639-9334 calories/day (30-35 kcal/kg CBW) 100.4 lb

68-91 g protein/day (1.5-2 g pro/kg CBW)





Diet Adequacy:  Not meeting calorie needs, Not meeting protein needs- held for surgery



Diet Tolerance: tolerating tube feeding



Diet Education Needs Assessment: Diet education not indicated, patient on 
temporary/transition diet.



Nutrition Care Level: moderate



Nutrition Diagnosis: Inadequate energy and protein intake related to current medical 
conditions and PEG on admit as evidenced by NPO.



Goal: Patient will meet % of estimated needs by follow up 



Progress: progressing



Interventions:

-Composition, Rate, Route, IVF, Prescription medications, Liquid supplement, 
Multivitamin/mineral supplement therapy





Monitoring/Evaluation:

-Total energy intake, Total protein intake, Formula/Solution, Liquid supplement



Signed: Ena Bowles RD, EM[facility], [facility]

## 2020-05-13 NOTE — PROGRESS NOTE
DATE:  05/13/2020  

 

SUBJECTIVE:  Ms. Ramirez is a 55-year-old female, who is bedridden, severe dementia,

dysphagia, right hip decubiti with abscess, underwent incision and drainage, stage III

sacral decubiti with chronic osteomyelitis.  UTI with ESBL E coli, probably aspiration

pneumonia, on IV antibiotics. 

 

PHYSICAL EXAMINATION:

GENERAL:  The patient opens her eyes.  She is nonverbal. 

VITAL SIGNS:  Temperature is 98.5, blood pressure 101/58. 

HEART:  Regular rate. 

LUNGS:  Poor inspiratory effort. 

ABDOMEN:  Soft and she has a PEG tube.

LABORATORY DATA:  On the blood work; white count 7.79, hemoglobin 7.2, hematocrit 25.2,

potassium 4.2, creatinine is 0.43, glucose 110.  Coronavirus not detected.  Wound

culture shows ESBL Klebsiella pneumoniae and streptococcal viridans.  Urine culture

shows ESBL E coli.  She had a PICC line placed yesterday. 

 

ASSESSMENT:  

1. Sepsis secondary to infected decubiti and extended spectrum beta-lactamases urinary

tract infection. 

2. Probably aspiration pneumonia.

3. Severe end-stage dementia.

4. Urinary tract infection with extended spectrum beta-lactamases Escherichia coli.

5. Right hip infected decubiti with extended spectrum beta-lactamases Klebsiella

pneumoniae. 

6. Bedridden.

7. Dysphagia.

8. History of hypertension.

 

PLAN:  At present time is to continue wound care.  Continue IV antibiotics.  Aspiration

precautions as per family wishes.  They are preferring SNF instead of hospice, so I

requested a SNF evaluation.  The overall prognosis of the patient remains guarded due to

all her medical problems and her severe dementia. 

 

I spent more than 30 minutes examining the patient, reviewing overnight event, lab

results, and discussing plan of care with nurse. 

 

 

 

 

______________________________

MD HAMLET Dunlap/MODL

D:  05/13/2020 09:13:07

T:  05/13/2020 09:55:52

Job #:  [id number]

## 2020-05-14 VITALS — DIASTOLIC BLOOD PRESSURE: 58 MMHG | SYSTOLIC BLOOD PRESSURE: 103 MMHG

## 2020-05-14 VITALS — SYSTOLIC BLOOD PRESSURE: 108 MMHG | DIASTOLIC BLOOD PRESSURE: 66 MMHG

## 2020-05-14 VITALS — SYSTOLIC BLOOD PRESSURE: 101 MMHG | DIASTOLIC BLOOD PRESSURE: 43 MMHG

## 2020-05-14 VITALS — SYSTOLIC BLOOD PRESSURE: 107 MMHG | DIASTOLIC BLOOD PRESSURE: 73 MMHG

## 2020-05-14 VITALS — SYSTOLIC BLOOD PRESSURE: 111 MMHG | DIASTOLIC BLOOD PRESSURE: 54 MMHG

## 2020-05-14 VITALS — SYSTOLIC BLOOD PRESSURE: 111 MMHG | DIASTOLIC BLOOD PRESSURE: 53 MMHG

## 2020-05-14 VITALS — DIASTOLIC BLOOD PRESSURE: 67 MMHG | SYSTOLIC BLOOD PRESSURE: 108 MMHG

## 2020-05-14 VITALS — DIASTOLIC BLOOD PRESSURE: 43 MMHG | SYSTOLIC BLOOD PRESSURE: 101 MMHG

## 2020-05-14 RX ADMIN — CASTOR OIL AND BALSAM, PERU SCH GM: 788; 87 OINTMENT TOPICAL at 14:58

## 2020-05-14 RX ADMIN — FAMOTIDINE SCH MG: 10 INJECTION, SOLUTION INTRAVENOUS at 17:38

## 2020-05-14 RX ADMIN — MEROPENEM SCH MLS/HR: 500 INJECTION INTRAVENOUS at 21:10

## 2020-05-14 RX ADMIN — Medication SCH ML: at 20:30

## 2020-05-14 RX ADMIN — FAMOTIDINE SCH MG: 10 INJECTION, SOLUTION INTRAVENOUS at 09:14

## 2020-05-14 RX ADMIN — MEROPENEM SCH MLS/HR: 500 INJECTION INTRAVENOUS at 17:37

## 2020-05-14 RX ADMIN — Medication SCH ML: at 07:00

## 2020-05-14 RX ADMIN — Medication SCH ML: at 03:00

## 2020-05-14 RX ADMIN — Medication SCH ML: at 14:15

## 2020-05-14 RX ADMIN — MEROPENEM SCH MLS/HR: 500 INJECTION INTRAVENOUS at 05:30

## 2020-05-14 RX ADMIN — Medication SCH ML: at 10:35

## 2020-05-14 RX ADMIN — SODIUM CHLORIDE SCH MLS/HR: 9 INJECTION, SOLUTION INTRAVENOUS at 14:58

## 2020-05-14 RX ADMIN — Medication SCH ML: at 23:45

## 2020-05-14 NOTE — DISCHARGE SUMMARY
HOSPITAL COURSE:  Ms. Ramirez is a 55-year-old female with severe dementia, bedridden,

dysphagia on PEG feedings, multiple decubiti that came with right hip decubiti with

abscess.  She underwent incision and drainage and was started on IV antibiotics and

wound care.  She was found to have UTI with ESBL E coli and aspiration pneumonia.  The

plan is to transfer her to SNF on IV antibiotics and wound care. 

 

PHYSICAL EXAMINATION:

GENERAL:  She opens her eyes.  She is still verbal. 

VITAL SIGNS:  Temperature is 100.2, blood pressure 101/43. 

HEART:  Regular rate. 

LUNGS:  Poor inspiratory effort. 

ABDOMEN:  Soft.  She has a PEG tube.

LABORATORY DATA:  On the blood work, white count 7.79, hemoglobin 7.2, hematocrit 25.2,

potassium 4.2, creatinine 0.43.  COVID came back negative.  Urine culture showed ESBL E

coli.  Wound culture showed Klebsiella pneumonia, ESBL and Streptococcus viridans.  She

had a PICC line placed. 

 

DISCHARGE DIAGNOSES:  

1. Sepsis secondary to extended-spectrum beta-lactamase Escherichia coli and infected

decubiti. 

2. Aspiration pneumonia.

3. Severe end-stage dementia.

4. Urinary tract infection with extended-spectrum beta-lactamase Escherichia coli.

5. Right hip infected decubiti with the extended-spectrum beta-lactamase Klebsiella

pneumoniae. 

6. Bedridden.

7. Dysphagia on PEG feedings.

8. History of hypertension.

 

PLAN:  At present time is family refused hospice, so plan is to send her to SNF on IV

antibiotics and wound care.  She had a PICC line placed for long-term antibiotics.

Continue the other home medications.  Please see home medication reconciliation list. 

 

 

 

 

______________________________

MD HAMLET Dunlap/LANDON

D:  05/14/2020 09:12:56

T:  05/14/2020 15:54:47

Job #:  [id number]

## 2020-05-14 NOTE — NUR
FAXED COVID FORM TO [facility] REACHED OUT TO SEE IF THEY HAVE OBTAINED AUTH. WAITING ON 
RESPONSE. WILL UPDATE WHEN GET MORE INFORMATION.

## 2020-05-14 NOTE — PROGRESS NOTE
DATE:    

 

SUBJECTIVE:  The patient is seen and evaluated.  The patient's son is in the room.

Discussed with her son, and also wound was on the right hip, was seen with the son.

Questions answered. 

 

REVIEW OF SYSTEMS:

Unable to obtain review of system from patient secondary to her medical condition,

however, seems to be comfortable with eyes open, but no interaction.  The patient moans

when trying to examine the wound, so tried to be as gentle as possible. 

 

PHYSICAL EXAMINATION:

VITAL SIGNS:  Temperature 100.2, pulse is 103, respiration 24, blood pressure 101/43. 

GENERAL:  Comfortable in bed, leaning on her left side. 

CV:  S1-S2. 

CHEST:  Equal expansion.  Clear to auscultation.  Decreased breath sounds. 

ABDOMEN:  Soft, nontender.  No distention. 

HEENT:  Moist.  No pallor.  No JVD. 

EXTREMITIES:  Right hip deep wound with bone exposed.

MEDICATIONS:  Antibiotics, the patient is on meropenem.

 

LABORATORY STUDIES:  No new CBC or BMP from today.  Toxicology, no new toxicology.

Serology, no new serology.  Microbiology, sputum culture in progress.  Otherwise, no new

cultures available. 

 

RADIOLOGY STUDIES:  No new radiology.

 

ASSESSMENT AND PLAN:  

1. Aspiration pneumonia.

2. Right hip wound, which is deep with bone exposed.

3. Osteomyelitis of right hip.

4. Advanced dementia.

5. Dysphagia.

6. Status post leukocytosis.

7. Contracted extremities and bedbound. 

No surgical debridement was done yesterday, and no surgical debridements are planned as

of now, continue with meropenem at this time and plan is to go to skilled nursing

facility for continuation of her medical care.  The patient is status post PICC line

placement.  Further management of this patient is based on daily findings on laboratory

and physical examination.  Discussed with Dr. Godwin in details.  Please refer to chart

for more information. 

 

 

Dictated by Jaden Prather PA-C (Al)

 

______________________________

Jackelin Godwin MD

 

AS/MODL

D:  05/14/2020 10:23:16

T:  05/14/2020 11:51:06

Job #:  [id number]

## 2020-05-14 NOTE — DIAGNOSTIC IMAGING REPORT
Bilateral chest ultrasound



History:  Pleural effusion



Technique/findings:

Limited bilateral chest ultrasound was performed to evaluate for pleural

effusion. 



No right or left pleural effusion.



IMPRESSION:

No right or left pleural effusion.



Signed by: eDvante Miles MD on 5/14/2020 11:22 AM

## 2020-05-15 VITALS — DIASTOLIC BLOOD PRESSURE: 63 MMHG | SYSTOLIC BLOOD PRESSURE: 114 MMHG

## 2020-05-15 VITALS — DIASTOLIC BLOOD PRESSURE: 59 MMHG | SYSTOLIC BLOOD PRESSURE: 94 MMHG

## 2020-05-15 VITALS — DIASTOLIC BLOOD PRESSURE: 60 MMHG | SYSTOLIC BLOOD PRESSURE: 104 MMHG

## 2020-05-15 LAB
ALBUMIN SERPL-MCNC: 2.1 G/DL (ref 3.5–5)
ALBUMIN/GLOB SERPL: 0.4 {RATIO} (ref 0.8–2)
ALP SERPL-CCNC: 155 IU/L (ref 40–150)
ALT SERPL-CCNC: 19 IU/L (ref 0–55)
ANION GAP SERPL CALC-SCNC: 8.8 MMOL/L (ref 8–16)
BUN SERPL-MCNC: 14 MG/DL (ref 7–26)
BUN/CREAT SERPL: 36 (ref 6–25)
CALCIUM SERPL-MCNC: 8.5 MG/DL (ref 8.4–10.2)
CHLORIDE SERPL-SCNC: 106 MMOL/L (ref 98–107)
CO2 SERPL-SCNC: 29 MMOL/L (ref 22–29)
EGFRCR SERPLBLD CKD-EPI 2021: > 60 ML/MIN (ref 60–?)
GLOBULIN PLAS-MCNC: 4.9 G/DL (ref 2.3–3.5)
GLUCOSE SERPLBLD-MCNC: 107 MG/DL (ref 74–118)
POTASSIUM SERPL-SCNC: 3.8 MMOL/L (ref 3.5–5.1)
SODIUM SERPL-SCNC: 140 MMOL/L (ref 136–145)

## 2020-05-15 RX ADMIN — CASTOR OIL AND BALSAM, PERU SCH GM: 788; 87 OINTMENT TOPICAL at 09:45

## 2020-05-15 RX ADMIN — MEROPENEM SCH MLS/HR: 500 INJECTION INTRAVENOUS at 05:00

## 2020-05-15 RX ADMIN — Medication SCH ML: at 11:00

## 2020-05-15 RX ADMIN — Medication SCH ML: at 06:05

## 2020-05-15 RX ADMIN — Medication SCH ML: at 03:00

## 2020-05-15 RX ADMIN — MEROPENEM SCH MLS/HR: 500 INJECTION INTRAVENOUS at 13:00

## 2020-05-15 RX ADMIN — FAMOTIDINE SCH MG: 10 INJECTION, SOLUTION INTRAVENOUS at 09:45

## 2020-05-15 NOTE — NUR
Patient resting comfortably. No acute distress noted. Shift report given to oncoming nurse 
regarding patient's status.

## 2020-05-15 NOTE — NUR
SKILLED NURSING FACILITY DISCHARGE INFORMATION 

PATIENT HAS BEEN ACCEPTED TO: [facility]

NAME: [facility] 

ADDRESS:[address]

ACCEPTING : JOON ERWIN MD:SUKH

ROOM:[id number]

NURSE CALL REPORT TO: [phone]

IMM SIGNED AND OBTAINED (if applicable):NA

THE FOLLOWING DOCUMENTS MUST ACCOMPANY PATIENT FOR TRANSFER:

COPIED CHART: PACKET

## 2020-05-16 NOTE — DISCHARGE SUMMARY
HOSPITAL COURSE:  Ms. Ramirez is a 55-year-old female with severe anemia, bedridden,

dysphagia, multiple decubiti, came to the emergency room with right hip decubiti with

abscess, requiring incision and drainage.  She was started on IV antibiotics and wound

care.  She also was found to have UTI with ESBL E coli and aspiration pneumonia.  The

plan at present time is to transfer her to SNF to continue IV antibiotics and wound

care. 

 

PHYSICAL EXAMINATION:

GENERAL:  She opens her eyes, but she is nonverbal. 

VITAL SIGNS:  Temperature is 98.2, blood pressure 104/60. 

HEART:  Regular rate. 

LUNGS:  Decreased breath sounds. 

ABDOMEN:  Distended and soft.

LABORATORY DATA:  On the blood work; white count is 7.79, hemoglobin 7.2, hematocrit

25.2, potassium 3.8, creatinine is 0.39.  Glucose 107.  Urine culture showed ESBL E

coli.  Wound culture showed ESBL Klebsiella pneumonia and Streptococcus viridans.  Chest

ultrasound showed normal right or left pleural effusion. 

 

DISCHARGE DIAGNOSES:  

1. Sepsis secondary to extended spectrum beta-lactamases urinary tract infection and

infected decubiti. 

2. Aspiration pneumonia.

3. Severe end-stage dementia.

4. Urinary tract infection with extended spectrum beta-lactamases Escherichia coli.

5. Right hip infected decubiti with extended spectrum beta-lactamases Klebsiella

pneumoniae. 

6. Bedridden.

7. Dysphagia.

8. Hypertension.

 

PLAN:  At the present time is to discharge her to SNF on IV antibiotics and wound care,

if okay with consultants.  Family refused hospice.  The patient has a PICC line placed.

Continue other home medications.  The overall prognosis of the patient remains guarded

due to all her medical conditions and the severe end-stage dementia.  Please see home

medication reconciliation list. 

 

 

 

 

______________________________

MD HAMLET Dunlap/LANDON

D:  05/15/2020 09:34:29

T:  05/16/2020 00:25:03

Job #:  [id number]

## 2022-05-09 NOTE — NUR
Spoke to alanna Kelsey regarding admission information and so states that patient does not take 
any home medications. Burow's Advancement Flap Text: The defect edges were debeveled with a #15 scalpel blade.  Given the location of the defect and the proximity to free margins a Burow's advancement flap was deemed most appropriate.  Using a sterile surgical marker, the appropriate advancement flap was drawn incorporating the defect and placing the expected incisions within the relaxed skin tension lines where possible.    The area thus outlined was incised deep to adipose tissue with a #15 scalpel blade.  The skin margins were undermined to an appropriate distance in all directions utilizing iris scissors.

## 2022-10-16 NOTE — PROGRESS NOTE
DATE:    

 

SUBJECTIVE:  The patient seen and evaluated.  Available labs and notes reviewed.

Discussed with the staff. 

 

REVIEW OF SYSTEMS:

Unable to obtain review of systems secondary to the patient's medical condition.

 

OBJECTIVE:  VITAL SIGNS:  Temperature 98.2, pulse is 100, respirations 18, and blood

pressure 104/60. 

GENERAL:  Comfortable in bed.  Eyes open, no verbal interaction. 

CV:  S1, S2. 

CHEST:  Equal expansion.  Decreased breath sounds.  No acute distress. 

ABDOMEN:  Soft.  No distention.  Positive bowel sounds. 

HEENT:  Moist.  No pallor.  No JVD. 

EXTREMITIES:  Right hip wound deep to the bone packed on local care with contracted

extremities. 

 

MEDICATIONS/ANTIBIOTICS:  The patient is on meropenem.

 

LABORATORY STUDIES:  No new CBC from today.  However, BMP showed sodium 140, potassium

3.8, and creatinine of 0.39.  Her total protein improved from 6.7 to 7.  Her albumin

stable at 2.1.  Microbiology, sputum culture is pending from yesterday. 

 

RADIOLOGY:  The patient had ultrasound of the chest yesterday showing no right or left

pleural effusion. 

 

ASSESSMENT AND PLAN:  

1. Aspiration pneumonia.

2. Osteomyelitis of the right hip.

3. Right hip deep wound to the bone with bone exposure.

4. Dementia.

5. Dysphagia.

6. Leukocytosis, resolved.

7. Contracted extremities. 

Sputum was sent in as it was ordered by Pulmonology.  Cultures still pending.  Continue

with antibiotics as mentioned above.  Discussed with the nurse.  Plan is to go to

skilled nursing facility today.  __________.  Discussed with Dr. Godwin in detail.

Please refer to chart for more information. 

 

 

Dictated by Jaden Prather PA-C (Al)

 

______________________________

Jackelin Godwin MD

 

AS/MODL

D:  05/15/2020 09:20:50

T:  05/15/2020 11:22:23

Job #:  [id number] 3 y/o F w/ splinters on right leg s/p on [place]. Will attempt to remove. Parents educated on nature of condition, advised to warm compress for splinters on not able to be removed & use of Bacitracin.
